# Patient Record
Sex: FEMALE | Race: WHITE
[De-identification: names, ages, dates, MRNs, and addresses within clinical notes are randomized per-mention and may not be internally consistent; named-entity substitution may affect disease eponyms.]

---

## 2017-01-13 ENCOUNTER — HOSPITAL ENCOUNTER (EMERGENCY)
Dept: HOSPITAL 10 - E/R | Age: 67
Discharge: HOME | End: 2017-01-13
Payer: MEDICARE

## 2017-01-13 VITALS
HEIGHT: 62 IN | WEIGHT: 196.21 LBS | HEIGHT: 62 IN | BODY MASS INDEX: 36.11 KG/M2 | WEIGHT: 196.21 LBS | BODY MASS INDEX: 36.11 KG/M2

## 2017-01-13 VITALS — RESPIRATION RATE: 18 BRPM | DIASTOLIC BLOOD PRESSURE: 77 MMHG | SYSTOLIC BLOOD PRESSURE: 137 MMHG | HEART RATE: 88 BPM

## 2017-01-13 DIAGNOSIS — Z79.01: ICD-10-CM

## 2017-01-13 DIAGNOSIS — R42: Primary | ICD-10-CM

## 2017-01-13 DIAGNOSIS — R11.0: ICD-10-CM

## 2017-01-13 DIAGNOSIS — D64.9: ICD-10-CM

## 2017-01-13 DIAGNOSIS — R06.02: ICD-10-CM

## 2017-01-13 LAB
ALBUMIN SERPL-MCNC: 3.9 G/DL (ref 3.3–4.9)
ALBUMIN/GLOB SERPL: 0.95 {RATIO}
ALP SERPL-CCNC: 94 IU/L (ref 42–121)
ALT SERPL-CCNC: 32 IU/L (ref 13–69)
ANION GAP SERPL CALC-SCNC: 16 MMOL/L (ref 8–16)
APTT BLD: 54.7 SEC (ref 25–35)
AST SERPL-CCNC: 29 IU/L (ref 15–46)
BASOPHILS # BLD AUTO: 0 10^3/UL (ref 0–0.1)
BASOPHILS NFR BLD: 0.7 % (ref 0–2)
BILIRUB DIRECT SERPL-MCNC: 0 MG/DL (ref 0–0.2)
BILIRUB SERPL-MCNC: 0.6 MG/DL (ref 0.2–1.3)
BUN SERPL-MCNC: 13 MG/DL (ref 7–20)
CALCIUM SERPL-MCNC: 9.5 MG/DL (ref 8.4–10.2)
CHLORIDE SERPL-SCNC: 102 MMOL/L (ref 97–110)
CO2 SERPL-SCNC: 26 MMOL/L (ref 21–31)
CONDITION: 1
CREAT SERPL-MCNC: 0.77 MG/DL (ref 0.44–1)
EOSINOPHIL # BLD: 0 10^3/UL (ref 0–0.5)
EOSINOPHIL NFR BLD: 0.4 % (ref 0–7)
ERYTHROCYTE [DISTWIDTH] IN BLOOD BY AUTOMATED COUNT: 15 % (ref 11.5–14.5)
GLOBULIN SER-MCNC: 4.1 G/DL (ref 1.3–3.2)
GLUCOSE SERPL-MCNC: 105 MG/DL (ref 70–220)
HCT VFR BLD CALC: 33.3 % (ref 37–47)
HGB BLD-MCNC: 11.5 G/DL (ref 12–16)
INR PPP: 2.14
LH ANALYZER COMMENTS: 1
LYMPHOCYTES # BLD AUTO: 1.7 10^3/UL (ref 0.8–2.9)
LYMPHOCYTES NFR BLD AUTO: 30.8 % (ref 15–51)
MCH RBC QN AUTO: 29.7 PG (ref 29–33)
MCHC RBC AUTO-ENTMCNC: 34.5 G/DL (ref 32–37)
MCV RBC AUTO: 86.1 FL (ref 82–101)
MONOCYTES # BLD: 0.7 10^3/UL (ref 0.3–0.9)
MONOCYTES NFR BLD: 12.1 % (ref 0–11)
NEUTROPHILS # BLD: 3.2 10^3/UL (ref 1.6–7.5)
NEUTROPHILS NFR BLD AUTO: 56 % (ref 39–77)
NRBC # BLD MANUAL: 0 10^3/UL (ref 0–0)
NRBC BLD QL: 0 /100WBC (ref 0–0)
PLATELET # BLD: 186 10^3/UL (ref 140–440)
PMV BLD AUTO: 7.1 FL (ref 7.4–10.4)
POTASSIUM SERPL-SCNC: 3.5 MMOL/L (ref 3.5–5.1)
PROT SERPL-MCNC: 8 G/DL (ref 6.1–8.1)
PROTHROMBIN TIME: 24.1 SEC (ref 12.2–14.2)
PT RATIO: 1.9
RBC # BLD AUTO: 3.87 10^6/UL (ref 4.2–5.4)
SODIUM SERPL-SCNC: 140 MMOL/L (ref 135–144)
TROPONIN I SERPL-MCNC: < 0.012 NG/ML (ref 0–0.12)
WBC # BLD AUTO: 5.7 10^3/UL (ref 4.8–10.8)
WBC # BLD: 5.7 10^3/UL (ref 4.8–10.8)

## 2017-01-13 PROCEDURE — 83605 ASSAY OF LACTIC ACID: CPT

## 2017-01-13 PROCEDURE — 85610 PROTHROMBIN TIME: CPT

## 2017-01-13 PROCEDURE — 96374 THER/PROPH/DIAG INJ IV PUSH: CPT

## 2017-01-13 PROCEDURE — 36415 COLL VENOUS BLD VENIPUNCTURE: CPT

## 2017-01-13 PROCEDURE — 70450 CT HEAD/BRAIN W/O DYE: CPT

## 2017-01-13 PROCEDURE — 96376 TX/PRO/DX INJ SAME DRUG ADON: CPT

## 2017-01-13 PROCEDURE — 85730 THROMBOPLASTIN TIME PARTIAL: CPT

## 2017-01-13 PROCEDURE — 99285 EMERGENCY DEPT VISIT HI MDM: CPT

## 2017-01-13 PROCEDURE — 93005 ELECTROCARDIOGRAM TRACING: CPT

## 2017-01-13 PROCEDURE — 84484 ASSAY OF TROPONIN QUANT: CPT

## 2017-01-13 PROCEDURE — 83690 ASSAY OF LIPASE: CPT

## 2017-01-13 PROCEDURE — 85025 COMPLETE CBC W/AUTO DIFF WBC: CPT

## 2017-01-13 PROCEDURE — 80053 COMPREHEN METABOLIC PANEL: CPT

## 2017-01-13 NOTE — RADRPT
PROCEDURE:   CT Brain without. 

 

CLINICAL INDICATION:  Severe vertigo.

 

TECHNIQUE:   A CT of the brain was performed on multidetector high-resolution CT scanner utilizing a
xial sections from the skull base through the vertex without contrast.  The scan was reviewed in sof
t tissue brain and high frequency resolution bone algorithm windows.  Images were reviewed on a high
-resolution PACS workstation. One or more the following does reduction techniques were utilized: Aut
omated exposure control, adjustment of the mA/ or kV according to patient's size, or use of iterativ
e reconstruction technique. The exam CTDI = 41.12 mGy and the DLP = 720.23 mGy-cm. 

 

COMPARISON:  Brain MRI 02/03/2014.

 

FINDINGS:

 

The ventricles and sulci are mildly prominent indicative of volume loss. There is no intracranial he
morrhage, mass effect or midline shift.  No abnormal intra-axial or extra-axial fluid collections ar
e seen. The gray/white matter differentiation is preserved. 

 

There are mild scattered foci of hypoattenuation in the white matter, which are nonspecific in etiol
ogy but likely reflect chronic small vessel ischemic changes. Small old lacunar infarct is noted in 
the left centrum semiovale. There are trace intracranial vascular calcifications consistent with ath
erosclerosis. The visualized paranasal sinuses demonstrate mild scattered mucosal thickening mainly 
in ethmoid air cells and maxillary sinuses.  The mastoid air cells are essentially clear. 

 

IMPRESSION:

 

1.  No acute intracranial hemorrhage, transcortical infarction or mass effect.

 

2.  Trace intracranial atherosclerosis and mild chronic small vessel ischemic changes. 

 

3.  Small old lacunar infarct is noted in the left centrum semiovale. 

 

4.  Mild generalized cerebral volume loss.

 

 

RPTAT: HH

_____________________________________________ 

.Drew Rios MD, MD           Date    Time 

Electronically viewed and signed by .Drew Rios MD, MD on 01/13/2017 11:44 

 

D:  01/13/2017 11:44  T:  01/13/2017 11:44

.N/

## 2017-01-13 NOTE — ERD
ER Documentation


Chief Complaint


Date/Time


DATE: 1/13/17 


TIME: 14:30


Chief Complaint


dizziness x 2 weeks, chemo 8 days ago worsen since then , nauseated





HPI


This 66-year-old female presents for 2 weeks of vertigo. She has chronic 

vertigo but has been worse procedure chemotherapy 8 days ago.  She feels a 

pressure-like sensation on the right side of her head but denies ear pain. She 

was to clarify that she does not have pain of her head that is just a pressure.

  She is not moving she has very little vertigo but any movement causes 

increased. Said this for years and knows that years ago she had a bad reaction 

and ever which made her vertigo worse. She has not checked it since then. She 

also uses scopolamine patches at home but does not currently have one on.  

Denies any fever and chills. Denies chest pain shortness of breath.





ROS


All systems reviewed and are negative except as per history of present illness.





Medications


Home Meds


Active Scripts


Hydroxyzine Hcl* (Hydroxyzine Hcl*) 25 Mg Tablet, 25 MG PO TID, #30 TAB


   Prov:YANG MCCOY DO         1/13/17


Reported Medications


Scopolamine (Transderm-Scop) 1 Each Patch.td.3, 1 EACH TD EVERY 3 DAYS


   AS DIRECTED


   1/13/17


Ergocalciferol* (Drisdol* (Vitamin D2)) 50,000 Unit Capsule, 45406 UNIT PO Q7D, 

CAP


   1/13/17


Quetiapine Fumarate* (Quetiapine Fumarate*) 100 Mg Tablet, 100 MG PO HS, TAB


   1/13/17


Lorazepam* (Lorazepam*) 1 Mg Tablet, 1 MG PO DAILY Y for ANXIETY, #30 TAB


   1/13/17


Clonidine Hcl* (Clonidine Hcl*) 0.1 Mg Tab, 0.1 MG PO DAILY Y for ELEVATED 

BLOOD PRESSURE, TAB


   1/13/17


Folic Acid* (Folic Acid*) 1 Mg Tablet, 1 MG PO DAILY, TAB


   1/13/17


Vilazodone Hcl (Viibryd) 20 Mg Tablet, 20 MG PO DAILY, TAB


   1/13/17


Warfarin Sodium* (Warfarin Sodium*) 6 Mg Tablet, 6 MG PO DAILY, TAB


   1/13/17


Amlodipine Besylate* (Amlodipine Besylate*) 5 Mg Tablet, 5 MG PO DAILY, #2


   1/26/14


Discontinued Reported Medications


Paroxetine Hcl* (Paxil*) 40 Mg Tablet, 40 MG PO DAILY, #1


   1/26/14


Warfarin Sodium (Coumadin) 5 Mg/Vial Vial, 5 MG IV DAILY, #0.5


   1/26/14


Warfarin Sodium* (Coumadin*) 4 Mg Tablet, 4 MG PO DAILY, #1


   1/26/14


Acetaminophen (Tylenol) 500 Mg Tab


   1/23/11





Allergies


Allergies:  


Coded Allergies:  


     No Known Drug Allergies (Verified  Allergy, Mild, 1/13/17)





PMhx/Soc


History of Surgery:  Yes (L axilla lumpectomy)


Anesthesia Reaction:  No


Hx Neurological Disorder:  No


Hx Respiratory Disorders:  Yes (c/o SOB )


Hx Cardiac Disorders:  No


Hx Psychiatric Problems:  Yes (Anxiety, Depression)


Hx Miscellaneous Medical Probl:  No (hepatic cysts,anxiety,depression,kidney 

stones,LBP, R eye clots,vertigo)


Hx Alcohol Use:  No


Hx Substance Use:  No


Hx Tobacco Use:  No


Smoking Status:  Never smoker





Physical Exam


Vitals





Vital Signs








  Date Time  Temp Pulse Resp B/P Pulse Ox O2 Delivery O2 Flow Rate FiO2


 


1/13/17 13:02  88 18 137/77 100 Room Air  


 


1/13/17 10:44 98.1 105 20 188/101 99   








Physical Exam


Const:  []             No acute distress, pleasant


Head:   Atraumatic 


Eyes:    Normal Conjunctiva on the EOMI, PERRLA


ENT:    Normal External Ears, Nose and Mouth.


Neck:               Full range of motion..~ No meningismus.


Resp:    Clear to auscultation bilaterally


Cardio:    Regular rate and rhythm, no murmurs


Abd:    Soft, non tender, non distended. Normal bowel sounds


Skin:    No petechiae or rashes


Back:    No midline or flank tenderness


Ext:    No cyanosis, or edema


Neur:    Awake and alert and oriented 3, cranial nerves II through XII intact, 

certain on finger-nose intact, normal gait


Psych:    Normal Mood and Affect


Result Diagram:  


1/13/17 1046                                                                   

             1/13/17 1120





Results 24 hrs





 Laboratory Tests








Test


  1/13/17


10:46 1/13/17


11:20


 


Basophils # 0.010^3/ul  


 


Basophils % 0.7%  


 


Blood Morphology Comment   


 


Eosinophils # 0.010^3/ul  


 


Eosinophils % 0.4%  


 


Hematocrit 33.3%  


 


Hemoglobin 11.5g/dl  


 


Lactic Acid Level 1.4mmol/L  


 


Lymphocytes # 1.710^3/ul  


 


Lymphocytes % 30.8%  


 


Mean Corpuscular Hemoglobin 29.7pg  


 


Mean Corpuscular Hemoglobin


Concent 34.5g/dl 


  


 


 


Mean Corpuscular Volume 86.1fl  


 


Mean Platelet Volume 7.1fl  


 


Monocytes # 0.710^3/ul  


 


Monocytes % 12.1%  


 


Neutrophils # 3.210^3/ul  


 


Neutrophils % 56.0%  


 


Nucleated Red Blood Cells # 0.010^3/ul  


 


Nucleated Red Blood Cells % 0.0/100WBC  


 


Platelet Count 27320^3/UL  


 


Red Blood Count 3.8710^6/ul  


 


Red Cell Distribution Width 15.0%  


 


White Blood Count 5.710^3/ul  


 


Activated Partial


Thromboplast Time 


  54.7Sec 


 


 


Alanine Aminotransferase


(ALT/SGPT) 


  32IU/L 


 


 


Albumin  3.9g/dl 


 


Albumin/Globulin Ratio  0.95 


 


Alkaline Phosphatase  94IU/L 


 


Anion Gap  16 


 


Aspartate Amino Transf


(AST/SGOT) 


  29IU/L 


 


 


Blood Urea Nitrogen  13mg/dl 


 


Calcium Level  9.5mg/dl 


 


Carbon Dioxide Level  26mmol/L 


 


Chloride Level  102mmol/L 


 


Creatinine  0.77mg/dl 


 


Direct Bilirubin  0.00mg/dl 


 


Globulin  4.10g/dl 


 


Glucose Level  105mg/dl 


 


INR International Normalized


Ratio 


  2.14 


 


 


Indirect Bilirubin  0.6mg/dl 


 


Lipase  63U/L 


 


Potassium Level  3.5mmol/L 


 


Prothrombin Time  24.1Sec 


 


Prothrombin Time Ratio  1.9 


 


Sodium Level  140mmol/L 


 


Total Bilirubin  0.6mg/dl 


 


Total Protein  8.0g/dl 


 


Troponin I  < 0.012ng/ml 








 Current Medications








 Medications


  (Trade)  Dose


 Ordered  Sig/Kalie


 Route


 PRN Reason  Start Time


 Stop Time Status Last Admin


Dose Admin


 


 Sodium Chloride


  (NS)  1,000 ml @ 


 1,000 mls/hr  Q1H STAT


 IV


   1/13/17 10:46


 1/13/17 11:45 DC 1/13/17 11:08


 


 


 Ondansetron HCl


  (Zofran Inj)  4 mg  ONCE  STAT


 IV


   1/13/17 10:46


 1/13/17 10:48 DC 1/13/17 11:08


 


 


 Meclizine HCl


  (Antivert)  25 mg  ONCE  ONCE


 PO


   1/13/17 11:00


 1/13/17 11:01 DC 1/13/17 11:08


 


 


 Ondansetron HCl


  (Zofran Inj)  4 mg  ONCE  STAT


 IV


   1/13/17 10:48


 1/13/17 10:49 DC 1/13/17 11:41


 


 


 Scopolamine


  (Transderm-Scop)  1 patch  ONCE  ONCE


 TRANSDERM


   1/13/17 14:00


 1/13/17 14:01 DC 1/13/17 13:59


 











Procedures/MDM


66-year-old female chronic vertigo that she says been worse since her last 

chemotherapy appointment. She is recently seen an ENT in testing and did not 

find any reasons. She is given a trial of Antivert which she said had a similar 

reaction the last time which quickly resolved. She is also given a scopolamine 

patch. She states that her vertigo is much better when she got. She does not 

want to stay in the hospital. I also hydrated her with normal saline gave her 

Zofran which received her nausea.  There are no signs of acute infection. There 

are no signs of gross metastases to the brain.  She is ambulatory and in walk 

to the emergency room on her own power.  States today being discharged and 

having MRI as an outpatient. Family also likes this option.  We'll discharge 

her with prescription for hydroxyzine and instructions not to take too many anti

-cholinergic medications same time. She has Caponi patches at home. Given 

instructions to call her primary care doctor, today if possible, to arrange an 

MRI as an outpatient and possibly a neurological referral.








EKG interpretation: Normal sinus rhythm rate of 91, normal axis, no ST or T-

wave changes concerning for acute ischemia. Normal EKG


Right monitor interpretation: Normal sinus rhythm without arrhythmia


CT head interpretation: I see no acute process no hemorrhage no abnormal mass, 

no midline shift, no skull fractures





Departure


Diagnosis:  


 Primary Impression:  


 Vertigo


 Additional Impression:  


 Normocytic anemia


Condition:  Stable


Patient Instructions:  Vertigo, Unspecified





Additional Instructions:  


Call your primary care doctor TOMORROW for an appointment during the next 2-3 

days.  Request a referral for MRI, and possibly a neurologist.  See the doctor 

sooner or return here if your condition worsens before your appointment time.











YANG MCCOY DO Jan 13, 2017 14:46

## 2018-02-12 ENCOUNTER — HOSPITAL ENCOUNTER (EMERGENCY)
Dept: HOSPITAL 91 - E/R | Age: 68
Discharge: HOME | End: 2018-02-12
Payer: MEDICARE

## 2018-02-12 ENCOUNTER — HOSPITAL ENCOUNTER (EMERGENCY)
Age: 68
Discharge: HOME | End: 2018-02-12

## 2018-02-12 DIAGNOSIS — C34.90: Primary | ICD-10-CM

## 2018-02-12 DIAGNOSIS — R07.9: ICD-10-CM

## 2018-02-12 DIAGNOSIS — Z79.01: ICD-10-CM

## 2018-02-12 LAB
ADD MAN DIFF?: NO
ALANINE AMINOTRANSFERASE: 26 IU/L (ref 13–69)
ALBUMIN/GLOBULIN RATIO: 0.97
ALBUMIN: 4.3 G/DL (ref 3.3–4.9)
ALKALINE PHOSPHATASE: 121 IU/L (ref 42–121)
ANION GAP: 13 (ref 8–16)
ASPARTATE AMINO TRANSFERASE: 40 IU/L (ref 15–46)
BASOPHIL #: 0.1 10^3/UL (ref 0–0.1)
BASOPHILS %: 1 % (ref 0–2)
BILIRUBIN,DIRECT: 0 MG/DL (ref 0–0.2)
BILIRUBIN,TOTAL: 0.3 MG/DL (ref 0.2–1.3)
BLOOD UREA NITROGEN: 12 MG/DL (ref 7–20)
CALCIUM: 10 MG/DL (ref 8.4–10.2)
CARBON DIOXIDE: 30 MMOL/L (ref 21–31)
CHLORIDE: 105 MMOL/L (ref 97–110)
CREATININE: 0.81 MG/DL (ref 0.44–1)
EOSINOPHILS #: 0.1 10^3/UL (ref 0–0.5)
EOSINOPHILS %: 0.8 % (ref 0–7)
GLOBULIN: 4.4 G/DL (ref 1.3–3.2)
GLUCOSE: 106 MG/DL (ref 70–220)
HEMATOCRIT: 40.2 % (ref 37–47)
HEMOGLOBIN: 12.9 G/DL (ref 12–16)
INR: 2.11
LYMPHOCYTES #: 2.1 10^3/UL (ref 0.8–2.9)
LYMPHOCYTES %: 23.4 % (ref 15–51)
MEAN CORPUSCULAR HEMOGLOBIN: 27.9 PG (ref 29–33)
MEAN CORPUSCULAR HGB CONC: 32.1 G/DL (ref 32–37)
MEAN CORPUSCULAR VOLUME: 86.8 FL (ref 82–101)
MEAN PLATELET VOLUME: 9.5 FL (ref 7.4–10.4)
MONOCYTE #: 0.7 10^3/UL (ref 0.3–0.9)
MONOCYTES %: 7.8 % (ref 0–11)
NEUTROPHIL #: 5.9 10^3/UL (ref 1.6–7.5)
NEUTROPHILS %: 66.4 % (ref 39–77)
NUCLEATED RED BLOOD CELLS #: 0 10^3/UL (ref 0–0)
NUCLEATED RED BLOOD CELLS%: 0 /100WBC (ref 0–0)
PARTIAL THROMBOPLASTIN TIME: 63.1 SEC (ref 25–35)
PLATELET COUNT: 354 10^3/UL (ref 140–415)
POTASSIUM: 4 MMOL/L (ref 3.5–5.1)
PROTIME: 24.2 SEC (ref 11.9–14.9)
PT RATIO: 1.9
RED BLOOD COUNT: 4.63 10^6/UL (ref 4.2–5.4)
RED CELL DISTRIBUTION WIDTH: 13.5 % (ref 11.5–14.5)
SODIUM: 144 MMOL/L (ref 135–144)
TOTAL PROTEIN: 8.7 G/DL (ref 6.1–8.1)
TROPONIN-I: < 0.012 NG/ML (ref 0–0.12)
WHITE BLOOD COUNT: 8.9 10^3/UL (ref 4.8–10.8)

## 2018-02-12 PROCEDURE — 85025 COMPLETE CBC W/AUTO DIFF WBC: CPT

## 2018-02-12 PROCEDURE — 96376 TX/PRO/DX INJ SAME DRUG ADON: CPT

## 2018-02-12 PROCEDURE — 71275 CT ANGIOGRAPHY CHEST: CPT

## 2018-02-12 PROCEDURE — 84484 ASSAY OF TROPONIN QUANT: CPT

## 2018-02-12 PROCEDURE — 85610 PROTHROMBIN TIME: CPT

## 2018-02-12 PROCEDURE — 96374 THER/PROPH/DIAG INJ IV PUSH: CPT

## 2018-02-12 PROCEDURE — 93005 ELECTROCARDIOGRAM TRACING: CPT

## 2018-02-12 PROCEDURE — 71045 X-RAY EXAM CHEST 1 VIEW: CPT

## 2018-02-12 PROCEDURE — 80053 COMPREHEN METABOLIC PANEL: CPT

## 2018-02-12 PROCEDURE — 96375 TX/PRO/DX INJ NEW DRUG ADDON: CPT

## 2018-02-12 PROCEDURE — 36415 COLL VENOUS BLD VENIPUNCTURE: CPT

## 2018-02-12 PROCEDURE — 85730 THROMBOPLASTIN TIME PARTIAL: CPT

## 2018-02-12 PROCEDURE — 99285 EMERGENCY DEPT VISIT HI MDM: CPT

## 2018-02-12 PROCEDURE — 93970 EXTREMITY STUDY: CPT

## 2018-02-12 RX ADMIN — VASOPRESSIN 1 ML/SEC: 20 INJECTION, SOLUTION INTRAMUSCULAR; SUBCUTANEOUS at 11:22

## 2018-02-12 RX ADMIN — HYDROMORPHONE HYDROCHLORIDE 1 MG: 1 INJECTION, SOLUTION INTRAMUSCULAR; INTRAVENOUS; SUBCUTANEOUS at 09:06

## 2018-02-12 RX ADMIN — IOHEXOL 1 ML/SEC: 350 INJECTION, SOLUTION INTRAVENOUS at 11:21

## 2018-02-12 RX ADMIN — HYDROMORPHONE HYDROCHLORIDE 1 MG: 1 INJECTION, SOLUTION INTRAMUSCULAR; INTRAVENOUS; SUBCUTANEOUS at 12:50

## 2018-02-12 RX ADMIN — SODIUM CHLORIDE 1 ML: 9 INJECTION, SOLUTION INTRAMUSCULAR; INTRAVENOUS; SUBCUTANEOUS at 11:20

## 2018-02-12 RX ADMIN — ONDANSETRON HYDROCHLORIDE 1 MG: 2 INJECTION, SOLUTION INTRAMUSCULAR; INTRAVENOUS at 09:05

## 2018-02-12 RX ADMIN — SODIUM CHLORIDE 1 ML: 9 INJECTION, SOLUTION INTRAMUSCULAR; INTRAVENOUS; SUBCUTANEOUS at 11:21

## 2018-02-12 RX ADMIN — VASOPRESSIN 1 ML/SEC: 20 INJECTION, SOLUTION INTRAMUSCULAR; SUBCUTANEOUS at 11:20

## 2018-02-12 RX ADMIN — IOHEXOL 1 ML/SEC: 350 INJECTION, SOLUTION INTRAVENOUS at 11:22

## 2018-02-24 ENCOUNTER — HOSPITAL ENCOUNTER (INPATIENT)
Dept: HOSPITAL 91 - E/R | Age: 68
LOS: 10 days | Discharge: HOME | DRG: 180 | End: 2018-03-06
Payer: MEDICARE

## 2018-02-24 ENCOUNTER — HOSPITAL ENCOUNTER (INPATIENT)
Age: 68
LOS: 10 days | Discharge: HOME | DRG: 180 | End: 2018-03-06

## 2018-02-24 DIAGNOSIS — I10: ICD-10-CM

## 2018-02-24 DIAGNOSIS — C34.31: ICD-10-CM

## 2018-02-24 DIAGNOSIS — J96.21: ICD-10-CM

## 2018-02-24 DIAGNOSIS — D63.8: ICD-10-CM

## 2018-02-24 DIAGNOSIS — D68.59: ICD-10-CM

## 2018-02-24 DIAGNOSIS — Z66: ICD-10-CM

## 2018-02-24 DIAGNOSIS — C79.51: ICD-10-CM

## 2018-02-24 DIAGNOSIS — C34.2: ICD-10-CM

## 2018-02-24 DIAGNOSIS — C78.7: ICD-10-CM

## 2018-02-24 DIAGNOSIS — D64.9: ICD-10-CM

## 2018-02-24 DIAGNOSIS — K59.00: ICD-10-CM

## 2018-02-24 DIAGNOSIS — M48.54XA: ICD-10-CM

## 2018-02-24 DIAGNOSIS — J90: ICD-10-CM

## 2018-02-24 DIAGNOSIS — C34.01: Primary | ICD-10-CM

## 2018-02-24 DIAGNOSIS — K80.20: ICD-10-CM

## 2018-02-24 LAB
% IRON SATURATION: 6 % SAT (ref 22–52)
ADD MAN DIFF?: NO
ADD UMIC: YES
ALANINE AMINOTRANSFERASE: 27 IU/L (ref 13–69)
ALBUMIN/GLOBULIN RATIO: 1
ALBUMIN: 3.6 G/DL (ref 3.3–4.9)
ALKALINE PHOSPHATASE: 120 IU/L (ref 42–121)
ANION GAP: 17 (ref 8–16)
ASPARTATE AMINO TRANSFERASE: 24 IU/L (ref 15–46)
BASOPHIL #: 0.1 10^3/UL (ref 0–0.1)
BASOPHILS %: 0.5 % (ref 0–2)
BILIRUBIN,DIRECT: 0 MG/DL (ref 0–0.2)
BILIRUBIN,TOTAL: 0 MG/DL (ref 0.2–1.3)
BLOOD UREA NITROGEN: 14 MG/DL (ref 7–20)
CALCIUM: 9.8 MG/DL (ref 8.4–10.2)
CARBON DIOXIDE: 30 MMOL/L (ref 21–31)
CHLORIDE: 100 MMOL/L (ref 97–110)
CREATININE: 0.89 MG/DL (ref 0.44–1)
EOSINOPHILS #: 0 10^3/UL (ref 0–0.5)
EOSINOPHILS %: 0.3 % (ref 0–7)
GLOBULIN: 3.6 G/DL (ref 1.3–3.2)
GLUCOSE: 135 MG/DL (ref 70–220)
HEMATOCRIT: 34.5 % (ref 37–47)
HEMOGLOBIN: 10.8 G/DL (ref 12–16)
INR: 2.18
IRON: 13 UG/DL (ref 35–150)
LACTIC ACID: 0.9 MMOL/L (ref 0.5–2)
LACTIC ACID: 1.1 MMOL/L (ref 0.5–2)
LACTIC ACID: 1.6 MMOL/L (ref 0.5–2)
LIPASE: 65 U/L (ref 23–300)
LYMPHOCYTES #: 0.7 10^3/UL (ref 0.8–2.9)
LYMPHOCYTES %: 7.6 % (ref 15–51)
MEAN CORPUSCULAR HEMOGLOBIN: 26.7 PG (ref 29–33)
MEAN CORPUSCULAR HGB CONC: 31.3 G/DL (ref 32–37)
MEAN CORPUSCULAR VOLUME: 85.2 FL (ref 82–101)
MEAN PLATELET VOLUME: 9.4 FL (ref 7.4–10.4)
MONOCYTE #: 0.7 10^3/UL (ref 0.3–0.9)
MONOCYTES %: 7.7 % (ref 0–11)
NEUTROPHIL #: 7.8 10^3/UL (ref 1.6–7.5)
NEUTROPHILS %: 83.6 % (ref 39–77)
NUCLEATED RED BLOOD CELLS #: 0 10^3/UL (ref 0–0)
NUCLEATED RED BLOOD CELLS%: 0 /100WBC (ref 0–0)
PARTIAL THROMBOPLASTIN TIME: 59.1 SEC (ref 25–35)
PLATELET COUNT: 329 10^3/UL (ref 140–415)
POTASSIUM: 4.2 MMOL/L (ref 3.5–5.1)
PROTIME: 24.8 SEC (ref 11.9–14.9)
PT RATIO: 1.9
RED BLOOD COUNT: 4.05 10^6/UL (ref 4.2–5.4)
RED CELL DISTRIBUTION WIDTH: 13.3 % (ref 11.5–14.5)
SODIUM: 143 MMOL/L (ref 135–144)
TOTAL IRON BINDING CAPACITY: 201 UG/DL (ref 241–421)
TOTAL PROTEIN: 7.2 G/DL (ref 6.1–8.1)
TROPONIN-I: < 0.012 NG/ML (ref 0–0.12)
UR ASCORBIC ACID: NEGATIVE MG/DL
UR BACTERIA: (no result) /HPF
UR BILIRUBIN (DIP): NEGATIVE MG/DL
UR BLOOD (DIP): (no result) MG/DL
UR CLARITY: CLEAR
UR COLOR: (no result)
UR GLUCOSE (DIP): NEGATIVE MG/DL
UR KETONES (DIP): NEGATIVE MG/DL
UR LEUKOCYTE ESTERASE (DIP): NEGATIVE LEU/UL
UR MUCUS: (no result) /HPF
UR NITRITE (DIP): NEGATIVE MG/DL
UR PH (DIP): 5 (ref 5–9)
UR RBC: 9 /HPF (ref 0–5)
UR SPECIFIC GRAVITY (DIP): 1.02 (ref 1–1.03)
UR TOTAL PROTEIN (DIP): (no result) MG/DL
UR UROBILINOGEN (DIP): NEGATIVE MG/DL
UR WBC: 1 /HPF (ref 0–5)
WHITE BLOOD COUNT: 9.4 10^3/UL (ref 4.8–10.8)

## 2018-02-24 PROCEDURE — 96368 THER/DIAG CONCURRENT INF: CPT

## 2018-02-24 PROCEDURE — 94660 CPAP INITIATION&MGMT: CPT

## 2018-02-24 PROCEDURE — 94640 AIRWAY INHALATION TREATMENT: CPT

## 2018-02-24 PROCEDURE — 94664 DEMO&/EVAL PT USE INHALER: CPT

## 2018-02-24 PROCEDURE — 97116 GAIT TRAINING THERAPY: CPT

## 2018-02-24 PROCEDURE — 5A09357 ASSISTANCE WITH RESPIRATORY VENTILATION, LESS THAN 24 CONSECUTIVE HOURS, CONTINUOUS POSITIVE AIRWAY PRESSURE: ICD-10-PCS

## 2018-02-24 PROCEDURE — 97535 SELF CARE MNGMENT TRAINING: CPT

## 2018-02-24 PROCEDURE — 84100 ASSAY OF PHOSPHORUS: CPT

## 2018-02-24 PROCEDURE — 84484 ASSAY OF TROPONIN QUANT: CPT

## 2018-02-24 PROCEDURE — 81001 URINALYSIS AUTO W/SCOPE: CPT

## 2018-02-24 PROCEDURE — 84155 ASSAY OF PROTEIN SERUM: CPT

## 2018-02-24 PROCEDURE — 87102 FUNGUS ISOLATION CULTURE: CPT

## 2018-02-24 PROCEDURE — 83540 ASSAY OF IRON: CPT

## 2018-02-24 PROCEDURE — 82040 ASSAY OF SERUM ALBUMIN: CPT

## 2018-02-24 PROCEDURE — 83735 ASSAY OF MAGNESIUM: CPT

## 2018-02-24 PROCEDURE — 85025 COMPLETE CBC W/AUTO DIFF WBC: CPT

## 2018-02-24 PROCEDURE — 71045 X-RAY EXAM CHEST 1 VIEW: CPT

## 2018-02-24 PROCEDURE — 83605 ASSAY OF LACTIC ACID: CPT

## 2018-02-24 PROCEDURE — 97165 OT EVAL LOW COMPLEX 30 MIN: CPT

## 2018-02-24 PROCEDURE — 99285 EMERGENCY DEPT VISIT HI MDM: CPT

## 2018-02-24 PROCEDURE — 36415 COLL VENOUS BLD VENIPUNCTURE: CPT

## 2018-02-24 PROCEDURE — 85730 THROMBOPLASTIN TIME PARTIAL: CPT

## 2018-02-24 PROCEDURE — 83615 LACTATE (LD) (LDH) ENZYME: CPT

## 2018-02-24 PROCEDURE — 97161 PT EVAL LOW COMPLEX 20 MIN: CPT

## 2018-02-24 PROCEDURE — 76942 ECHO GUIDE FOR BIOPSY: CPT

## 2018-02-24 PROCEDURE — 89051 BODY FLUID CELL COUNT: CPT

## 2018-02-24 PROCEDURE — 82945 GLUCOSE OTHER FLUID: CPT

## 2018-02-24 PROCEDURE — 76705 ECHO EXAM OF ABDOMEN: CPT

## 2018-02-24 PROCEDURE — 80048 BASIC METABOLIC PNL TOTAL CA: CPT

## 2018-02-24 PROCEDURE — 71275 CT ANGIOGRAPHY CHEST: CPT

## 2018-02-24 PROCEDURE — 83690 ASSAY OF LIPASE: CPT

## 2018-02-24 PROCEDURE — 96365 THER/PROPH/DIAG IV INF INIT: CPT

## 2018-02-24 PROCEDURE — 96366 THER/PROPH/DIAG IV INF ADDON: CPT

## 2018-02-24 PROCEDURE — 84157 ASSAY OF PROTEIN OTHER: CPT

## 2018-02-24 PROCEDURE — 74177 CT ABD & PELVIS W/CONTRAST: CPT

## 2018-02-24 PROCEDURE — 87070 CULTURE OTHR SPECIMN AEROBIC: CPT

## 2018-02-24 PROCEDURE — 94644 CONT INHLJ TX 1ST HOUR: CPT

## 2018-02-24 PROCEDURE — 96375 TX/PRO/DX INJ NEW DRUG ADDON: CPT

## 2018-02-24 PROCEDURE — 80069 RENAL FUNCTION PANEL: CPT

## 2018-02-24 PROCEDURE — 80053 COMPREHEN METABOLIC PANEL: CPT

## 2018-02-24 PROCEDURE — 97530 THERAPEUTIC ACTIVITIES: CPT

## 2018-02-24 PROCEDURE — 87040 BLOOD CULTURE FOR BACTERIA: CPT

## 2018-02-24 PROCEDURE — 87116 MYCOBACTERIA CULTURE: CPT

## 2018-02-24 PROCEDURE — 85610 PROTHROMBIN TIME: CPT

## 2018-02-24 RX ADMIN — PIPERACILLIN SODIUM AND TAZOBACTAM SODIUM 1 MLS/HR: 3; .375 INJECTION, POWDER, LYOPHILIZED, FOR SOLUTION INTRAVENOUS at 10:30

## 2018-02-24 RX ADMIN — PIPERACILLIN SODIUM AND TAZOBACTAM SODIUM 1 MLS/HR: 3; .375 INJECTION, POWDER, LYOPHILIZED, FOR SOLUTION INTRAVENOUS at 22:46

## 2018-02-24 RX ADMIN — AMLODIPINE BESYLATE 1 MG: 10 TABLET ORAL at 14:00

## 2018-02-24 RX ADMIN — HYDROMORPHONE HYDROCHLORIDE 1 MG: 1 INJECTION, SOLUTION INTRAMUSCULAR; INTRAVENOUS; SUBCUTANEOUS at 06:11

## 2018-02-24 RX ADMIN — MAGNESIUM HYDROXIDE 1 ML: 400 SUSPENSION ORAL at 20:10

## 2018-02-24 RX ADMIN — NITROGLYCERIN 1 TAB: 0.4 TABLET, ORALLY DISINTEGRATING SUBLINGUAL at 10:09

## 2018-02-24 RX ADMIN — SODIUM PHOSPHATE, DIBASIC AND SODIUM PHOSPHATE, MONOBASIC 1 ML: 7; 19 ENEMA RECTAL at 20:15

## 2018-02-24 RX ADMIN — DOCUSATE SODIUM AND SENNOSIDES 1 TAB: 8.6; 5 TABLET, FILM COATED ORAL at 20:10

## 2018-02-24 RX ADMIN — ONDANSETRON HYDROCHLORIDE 1 MG: 2 INJECTION, SOLUTION INTRAMUSCULAR; INTRAVENOUS at 06:11

## 2018-02-24 RX ADMIN — Medication 1 MLS/HR: at 20:00

## 2018-02-24 RX ADMIN — PHYTONADIONE 1 MG: 10 INJECTION, EMULSION INTRAMUSCULAR; INTRAVENOUS; SUBCUTANEOUS at 18:00

## 2018-02-24 RX ADMIN — Medication 1 MLS/HR: at 16:22

## 2018-02-24 RX ADMIN — ALBUTEROL SULFATE 1 MG: 2.5 SOLUTION RESPIRATORY (INHALATION) at 09:03

## 2018-02-24 RX ADMIN — METHYLPREDNISOLONE SODIUM SUCCINATE 1 MG: 40 INJECTION, POWDER, FOR SOLUTION INTRAMUSCULAR; INTRAVENOUS at 15:55

## 2018-02-24 RX ADMIN — PIPERACILLIN SODIUM AND TAZOBACTAM SODIUM 1 MLS/HR: 3; .375 INJECTION, POWDER, LYOPHILIZED, FOR SOLUTION INTRAVENOUS at 15:55

## 2018-02-24 RX ADMIN — MORPHINE SULFATE 1 MG: 2 INJECTION, SOLUTION INTRAMUSCULAR; INTRAVENOUS at 10:09

## 2018-02-24 RX ADMIN — THIAMINE HYDROCHLORIDE 1 MLS/HR: 100 INJECTION, SOLUTION INTRAMUSCULAR; INTRAVENOUS at 06:11

## 2018-02-24 RX ADMIN — DOCUSATE SODIUM AND SENNOSIDES 1 TAB: 8.6; 5 TABLET, FILM COATED ORAL at 16:22

## 2018-02-24 RX ADMIN — FUROSEMIDE 1 MG: 10 INJECTION, SOLUTION INTRAVENOUS at 10:09

## 2018-02-24 RX ADMIN — NITROGLYCERIN 1 INCH: 20 OINTMENT TOPICAL at 10:10

## 2018-02-24 RX ADMIN — ONDANSETRON HYDROCHLORIDE 1 MG: 2 INJECTION, SOLUTION INTRAMUSCULAR; INTRAVENOUS at 19:56

## 2018-02-24 RX ADMIN — HYDROMORPHONE HYDROCHLORIDE 1 MG: 1 INJECTION, SOLUTION INTRAMUSCULAR; INTRAVENOUS; SUBCUTANEOUS at 19:56

## 2018-02-25 LAB
ADD MAN DIFF?: NO
ALBUMIN: 3.5 G/DL (ref 3.3–4.9)
ANION GAP: 14 (ref 8–16)
BASOPHIL #: 0 10^3/UL (ref 0–0.1)
BASOPHILS %: 0.3 % (ref 0–2)
BLOOD UREA NITROGEN: 13 MG/DL (ref 7–20)
CALCIUM: 9.4 MG/DL (ref 8.4–10.2)
CARBON DIOXIDE: 33 MMOL/L (ref 21–31)
CHLORIDE: 106 MMOL/L (ref 97–110)
CREATININE: 0.9 MG/DL (ref 0.44–1)
EOSINOPHILS #: 0 10^3/UL (ref 0–0.5)
EOSINOPHILS %: 0 % (ref 0–7)
GLUCOSE: 128 MG/DL (ref 70–220)
HEMATOCRIT: 31.5 % (ref 37–47)
HEMOGLOBIN: 9.9 G/DL (ref 12–16)
INR: 2.43
LYMPHOCYTES #: 0.7 10^3/UL (ref 0.8–2.9)
LYMPHOCYTES %: 11.9 % (ref 15–51)
MAGNESIUM: 2.2 MG/DL (ref 1.7–2.5)
MEAN CORPUSCULAR HEMOGLOBIN: 27 PG (ref 29–33)
MEAN CORPUSCULAR HGB CONC: 31.4 G/DL (ref 32–37)
MEAN CORPUSCULAR VOLUME: 86.1 FL (ref 82–101)
MEAN PLATELET VOLUME: 9.3 FL (ref 7.4–10.4)
MONOCYTE #: 0.4 10^3/UL (ref 0.3–0.9)
MONOCYTES %: 6.2 % (ref 0–11)
NEUTROPHIL #: 4.9 10^3/UL (ref 1.6–7.5)
NEUTROPHILS %: 81.3 % (ref 39–77)
NUCLEATED RED BLOOD CELLS #: 0 10^3/UL (ref 0–0)
NUCLEATED RED BLOOD CELLS%: 0 /100WBC (ref 0–0)
PHOSPHORUS: 4.7 MG/DL (ref 2.5–4.9)
PLATELET COUNT: 303 10^3/UL (ref 140–415)
POTASSIUM: 4.1 MMOL/L (ref 3.5–5.1)
PROTIME: 27.1 SEC (ref 11.9–14.9)
PT RATIO: 2.1
RED BLOOD COUNT: 3.66 10^6/UL (ref 4.2–5.4)
RED CELL DISTRIBUTION WIDTH: 13.6 % (ref 11.5–14.5)
SODIUM: 149 MMOL/L (ref 135–144)
WHITE BLOOD COUNT: 6 10^3/UL (ref 4.8–10.8)

## 2018-02-25 RX ADMIN — PHYTONADIONE 1 MG: 10 INJECTION, EMULSION INTRAMUSCULAR; INTRAVENOUS; SUBCUTANEOUS at 11:40

## 2018-02-25 RX ADMIN — ONDANSETRON HYDROCHLORIDE 1 MG: 2 INJECTION, SOLUTION INTRAMUSCULAR; INTRAVENOUS at 10:07

## 2018-02-25 RX ADMIN — PIPERACILLIN SODIUM AND TAZOBACTAM SODIUM 1 MLS/HR: 3; .375 INJECTION, POWDER, LYOPHILIZED, FOR SOLUTION INTRAVENOUS at 06:26

## 2018-02-25 RX ADMIN — METOPROLOL SUCCINATE 1 MG: 25 TABLET, EXTENDED RELEASE ORAL at 13:14

## 2018-02-25 RX ADMIN — PIPERACILLIN SODIUM AND TAZOBACTAM SODIUM 1 MLS/HR: 3; .375 INJECTION, POWDER, LYOPHILIZED, FOR SOLUTION INTRAVENOUS at 21:49

## 2018-02-25 RX ADMIN — AMLODIPINE BESYLATE 1 MG: 10 TABLET ORAL at 13:18

## 2018-02-25 RX ADMIN — DOCUSATE SODIUM AND SENNOSIDES 1 TAB: 8.6; 5 TABLET, FILM COATED ORAL at 20:52

## 2018-02-25 RX ADMIN — Medication 1 MLS/HR: at 20:55

## 2018-02-25 RX ADMIN — METHYLPREDNISOLONE SODIUM SUCCINATE 1 MG: 40 INJECTION, POWDER, FOR SOLUTION INTRAMUSCULAR; INTRAVENOUS at 08:51

## 2018-02-25 RX ADMIN — LIDOCAINE 1 PATCH: 50 PATCH TOPICAL at 11:46

## 2018-02-25 RX ADMIN — PAROXETINE HYDROCHLORIDE 1 MG: 20 TABLET, FILM COATED ORAL at 08:57

## 2018-02-25 RX ADMIN — Medication 1 MLS/HR: at 15:14

## 2018-02-25 RX ADMIN — DOCUSATE SODIUM AND SENNOSIDES 1 TAB: 8.6; 5 TABLET, FILM COATED ORAL at 08:53

## 2018-02-25 RX ADMIN — FUROSEMIDE 1 MG: 10 INJECTION, SOLUTION INTRAVENOUS at 11:39

## 2018-02-25 RX ADMIN — Medication 1 MLS/HR: at 08:55

## 2018-02-25 RX ADMIN — Medication 1 MLS/HR: at 02:00

## 2018-02-25 RX ADMIN — PANTOPRAZOLE SODIUM 1 MG: 40 TABLET, DELAYED RELEASE ORAL at 06:26

## 2018-02-25 RX ADMIN — PIPERACILLIN SODIUM AND TAZOBACTAM SODIUM 1 MLS/HR: 3; .375 INJECTION, POWDER, LYOPHILIZED, FOR SOLUTION INTRAVENOUS at 13:18

## 2018-02-25 RX ADMIN — ONDANSETRON HYDROCHLORIDE 1 MG: 2 INJECTION, SOLUTION INTRAMUSCULAR; INTRAVENOUS at 20:52

## 2018-02-26 LAB
ADD MAN DIFF?: NO
ALBUMIN: 3.3 G/DL (ref 3.3–4.9)
ANION GAP: 12 (ref 8–16)
BASOPHIL #: 0 10^3/UL (ref 0–0.1)
BASOPHILS %: 0.5 % (ref 0–2)
BLOOD UREA NITROGEN: 13 MG/DL (ref 7–20)
CALCIUM: 9.4 MG/DL (ref 8.4–10.2)
CARBON DIOXIDE: 35 MMOL/L (ref 21–31)
CHLORIDE: 104 MMOL/L (ref 97–110)
CREATININE: 0.89 MG/DL (ref 0.44–1)
EOSINOPHILS #: 0 10^3/UL (ref 0–0.5)
EOSINOPHILS %: 0.3 % (ref 0–7)
GLUCOSE: 104 MG/DL (ref 70–220)
HEMATOCRIT: 31.4 % (ref 37–47)
HEMOGLOBIN: 10.1 G/DL (ref 12–16)
INR: 1.48
LYMPHOCYTES #: 1.5 10^3/UL (ref 0.8–2.9)
LYMPHOCYTES %: 19.9 % (ref 15–51)
MAGNESIUM: 2.1 MG/DL (ref 1.7–2.5)
MEAN CORPUSCULAR HEMOGLOBIN: 27.7 PG (ref 29–33)
MEAN CORPUSCULAR HGB CONC: 32.2 G/DL (ref 32–37)
MEAN CORPUSCULAR VOLUME: 86 FL (ref 82–101)
MEAN PLATELET VOLUME: 9.2 FL (ref 7.4–10.4)
MONOCYTE #: 0.7 10^3/UL (ref 0.3–0.9)
MONOCYTES %: 9.3 % (ref 0–11)
NEUTROPHIL #: 5.3 10^3/UL (ref 1.6–7.5)
NEUTROPHILS %: 69.6 % (ref 39–77)
NUCLEATED RED BLOOD CELLS #: 0 10^3/UL (ref 0–0)
NUCLEATED RED BLOOD CELLS%: 0 /100WBC (ref 0–0)
PHOSPHORUS: 3.2 MG/DL (ref 2.5–4.9)
PLATELET COUNT: 314 10^3/UL (ref 140–415)
POTASSIUM: 3.7 MMOL/L (ref 3.5–5.1)
PROTIME: 18.2 SEC (ref 11.9–14.9)
PT RATIO: 1.4
RED BLOOD COUNT: 3.65 10^6/UL (ref 4.2–5.4)
RED CELL DISTRIBUTION WIDTH: 13.7 % (ref 11.5–14.5)
SODIUM: 147 MMOL/L (ref 135–144)
WHITE BLOOD COUNT: 7.5 10^3/UL (ref 4.8–10.8)

## 2018-02-26 RX ADMIN — PAROXETINE HYDROCHLORIDE 1 MG: 20 TABLET, FILM COATED ORAL at 09:00

## 2018-02-26 RX ADMIN — PIPERACILLIN SODIUM AND TAZOBACTAM SODIUM 1 MLS/HR: 3; .375 INJECTION, POWDER, LYOPHILIZED, FOR SOLUTION INTRAVENOUS at 21:18

## 2018-02-26 RX ADMIN — LIDOCAINE 1 PATCH: 50 PATCH TOPICAL at 09:34

## 2018-02-26 RX ADMIN — Medication 1 MLS/HR: at 20:14

## 2018-02-26 RX ADMIN — PANTOPRAZOLE SODIUM 1 MG: 40 TABLET, DELAYED RELEASE ORAL at 06:01

## 2018-02-26 RX ADMIN — Medication 1 MLS/HR: at 14:04

## 2018-02-26 RX ADMIN — PIPERACILLIN SODIUM AND TAZOBACTAM SODIUM 1 MLS/HR: 3; .375 INJECTION, POWDER, LYOPHILIZED, FOR SOLUTION INTRAVENOUS at 06:01

## 2018-02-26 RX ADMIN — AMLODIPINE BESYLATE 1 MG: 10 TABLET ORAL at 09:29

## 2018-02-26 RX ADMIN — PIPERACILLIN SODIUM AND TAZOBACTAM SODIUM 1 MLS/HR: 3; .375 INJECTION, POWDER, LYOPHILIZED, FOR SOLUTION INTRAVENOUS at 14:04

## 2018-02-26 RX ADMIN — Medication 1 MLS/HR: at 07:44

## 2018-02-26 RX ADMIN — METHYLPREDNISOLONE SODIUM SUCCINATE 1 MG: 40 INJECTION, POWDER, FOR SOLUTION INTRAMUSCULAR; INTRAVENOUS at 09:28

## 2018-02-26 RX ADMIN — METOPROLOL SUCCINATE 1 MG: 25 TABLET, EXTENDED RELEASE ORAL at 09:30

## 2018-02-26 RX ADMIN — DOCUSATE SODIUM AND SENNOSIDES 1 TAB: 8.6; 5 TABLET, FILM COATED ORAL at 20:13

## 2018-02-26 RX ADMIN — Medication 1 MLS/HR: at 02:50

## 2018-02-26 RX ADMIN — DOCUSATE SODIUM AND SENNOSIDES 1 TAB: 8.6; 5 TABLET, FILM COATED ORAL at 09:29

## 2018-02-26 RX ADMIN — FUROSEMIDE 1 MG: 10 INJECTION, SOLUTION INTRAVENOUS at 09:29

## 2018-02-26 RX ADMIN — ALPRAZOLAM 1 MG: 0.25 TABLET ORAL at 10:30

## 2018-02-26 RX ADMIN — HYDROMORPHONE HYDROCHLORIDE 1 MG: 1 INJECTION, SOLUTION INTRAMUSCULAR; INTRAVENOUS; SUBCUTANEOUS at 00:16

## 2018-02-27 LAB
ADD MAN DIFF?: NO
ALBUMIN: 3.3 G/DL (ref 3.3–4.9)
ANION GAP: 13 (ref 8–16)
BASOPHIL #: 0 10^3/UL (ref 0–0.1)
BASOPHILS %: 0.5 % (ref 0–2)
BLOOD UREA NITROGEN: 16 MG/DL (ref 7–20)
BODY FLD TYPE: (no result)
CALCIUM: 9.5 MG/DL (ref 8.4–10.2)
CARBON DIOXIDE: 36 MMOL/L (ref 21–31)
CHLORIDE: 103 MMOL/L (ref 97–110)
CREATININE: 0.86 MG/DL (ref 0.44–1)
EOSINOPHILS #: 0.1 10^3/UL (ref 0–0.5)
EOSINOPHILS %: 1 % (ref 0–7)
FLD CLARITY: (no result)
FLD COLOR: YELLOW
FLD MN%: 95 %
FLD PMN%: 5 %
FLD RBC: (no result) /UL
FLD TYPE: (no result)
FLD VOLUME: 450 ML
FLD WBC: 415 /CMM
FLUID GLUCOSE: 101 MG/DL
FLUID LD: 1212 U/L
FLUID TOTAL PROTEIN: 3.4 G/DL
FLUID TYPE: (no result)
GLUCOSE: 107 MG/DL (ref 70–220)
HEMATOCRIT: 34.2 % (ref 37–47)
HEMOGLOBIN: 10.5 G/DL (ref 12–16)
LACTATE DEHYDROGENASE: 1212 IU/L (ref 313–618)
LYMPHOCYTES #: 1.6 10^3/UL (ref 0.8–2.9)
LYMPHOCYTES %: 22.4 % (ref 15–51)
Lab: 5 %
Lab: 95 %
MAGNESIUM: 2.2 MG/DL (ref 1.7–2.5)
MEAN CORPUSCULAR HEMOGLOBIN: 26.9 PG (ref 29–33)
MEAN CORPUSCULAR HGB CONC: 30.7 G/DL (ref 32–37)
MEAN CORPUSCULAR VOLUME: 87.5 FL (ref 82–101)
MEAN PLATELET VOLUME: 8.9 FL (ref 7.4–10.4)
MONOCYTE #: 0.6 10^3/UL (ref 0.3–0.9)
MONOCYTES %: 8.6 % (ref 0–11)
NEUTROPHIL #: 4.9 10^3/UL (ref 1.6–7.5)
NEUTROPHILS %: 67.1 % (ref 39–77)
NUCLEATED RED BLOOD CELLS #: 0 10^3/UL (ref 0–0)
NUCLEATED RED BLOOD CELLS%: 0 /100WBC (ref 0–0)
PHOSPHORUS: 3 MG/DL (ref 2.5–4.9)
PLATELET COUNT: 326 10^3/UL (ref 140–415)
POTASSIUM: 3.7 MMOL/L (ref 3.5–5.1)
RED BLOOD COUNT: 3.91 10^6/UL (ref 4.2–5.4)
RED CELL DISTRIBUTION WIDTH: 14 % (ref 11.5–14.5)
SODIUM: 148 MMOL/L (ref 135–144)
TOTAL PROTEIN: 3.4 G/DL (ref 6.1–8.1)
WHITE BLOOD COUNT: 7.3 10^3/UL (ref 4.8–10.8)

## 2018-02-27 PROCEDURE — 0W9B3ZX DRAINAGE OF LEFT PLEURAL CAVITY, PERCUTANEOUS APPROACH, DIAGNOSTIC: ICD-10-PCS

## 2018-02-27 RX ADMIN — WARFARIN SODIUM 1 MG: 2 TABLET ORAL at 17:32

## 2018-02-27 RX ADMIN — PANTOPRAZOLE SODIUM 1 MG: 40 TABLET, DELAYED RELEASE ORAL at 05:26

## 2018-02-27 RX ADMIN — DOCUSATE SODIUM AND SENNOSIDES 1 TAB: 8.6; 5 TABLET, FILM COATED ORAL at 08:41

## 2018-02-27 RX ADMIN — METHADONE HYDROCHLORIDE 1 MG: 5 SOLUTION ORAL at 10:26

## 2018-02-27 RX ADMIN — ONDANSETRON HYDROCHLORIDE 1 MG: 2 INJECTION, SOLUTION INTRAMUSCULAR; INTRAVENOUS at 00:37

## 2018-02-27 RX ADMIN — Medication 1 MLS/HR: at 19:54

## 2018-02-27 RX ADMIN — DOCUSATE SODIUM AND SENNOSIDES 1 TAB: 8.6; 5 TABLET, FILM COATED ORAL at 19:54

## 2018-02-27 RX ADMIN — Medication 1 MLS/HR: at 08:39

## 2018-02-27 RX ADMIN — Medication 1 MLS/HR: at 01:56

## 2018-02-27 RX ADMIN — LIDOCAINE 1 PATCH: 50 PATCH TOPICAL at 08:42

## 2018-02-27 RX ADMIN — LIDOCAINE HYDROCHLORIDE 1 ML: 10 INJECTION, SOLUTION EPIDURAL; INFILTRATION; INTRACAUDAL; PERINEURAL at 09:45

## 2018-02-27 RX ADMIN — PIPERACILLIN SODIUM AND TAZOBACTAM SODIUM 1 MLS/HR: 3; .375 INJECTION, POWDER, LYOPHILIZED, FOR SOLUTION INTRAVENOUS at 22:23

## 2018-02-27 RX ADMIN — MAGNESIUM HYDROXIDE 1 ML: 400 SUSPENSION ORAL at 03:49

## 2018-02-27 RX ADMIN — METOPROLOL SUCCINATE 1 MG: 25 TABLET, EXTENDED RELEASE ORAL at 08:40

## 2018-02-27 RX ADMIN — HYDROMORPHONE HYDROCHLORIDE 1 MG: 1 INJECTION, SOLUTION INTRAMUSCULAR; INTRAVENOUS; SUBCUTANEOUS at 00:33

## 2018-02-27 RX ADMIN — PIPERACILLIN SODIUM AND TAZOBACTAM SODIUM 1 MLS/HR: 3; .375 INJECTION, POWDER, LYOPHILIZED, FOR SOLUTION INTRAVENOUS at 05:27

## 2018-02-27 RX ADMIN — AMLODIPINE BESYLATE 1 MG: 10 TABLET ORAL at 08:41

## 2018-02-27 RX ADMIN — Medication 1 MLS/HR: at 13:38

## 2018-02-27 RX ADMIN — ONDANSETRON HYDROCHLORIDE 1 MG: 2 INJECTION, SOLUTION INTRAMUSCULAR; INTRAVENOUS at 07:38

## 2018-02-27 RX ADMIN — METHADONE HYDROCHLORIDE 1 MG: 5 SOLUTION ORAL at 19:54

## 2018-02-27 RX ADMIN — FUROSEMIDE 1 MG: 10 INJECTION, SOLUTION INTRAVENOUS at 08:39

## 2018-02-27 RX ADMIN — METHYLPREDNISOLONE SODIUM SUCCINATE 1 MG: 40 INJECTION, POWDER, FOR SOLUTION INTRAMUSCULAR; INTRAVENOUS at 08:39

## 2018-02-27 RX ADMIN — HYDROMORPHONE HYDROCHLORIDE 1 MG: 1 INJECTION, SOLUTION INTRAMUSCULAR; INTRAVENOUS; SUBCUTANEOUS at 03:45

## 2018-02-27 RX ADMIN — PAROXETINE HYDROCHLORIDE 1 MG: 20 TABLET, FILM COATED ORAL at 08:41

## 2018-02-27 RX ADMIN — ALPRAZOLAM 1 MG: 0.25 TABLET ORAL at 09:03

## 2018-02-27 RX ADMIN — PIPERACILLIN SODIUM AND TAZOBACTAM SODIUM 1 MLS/HR: 3; .375 INJECTION, POWDER, LYOPHILIZED, FOR SOLUTION INTRAVENOUS at 13:38

## 2018-02-28 LAB
ADD MAN DIFF?: NO
ANION GAP: 11 (ref 8–16)
BASOPHIL #: 0.1 10^3/UL (ref 0–0.1)
BASOPHILS %: 0.7 % (ref 0–2)
BLOOD UREA NITROGEN: 15 MG/DL (ref 7–20)
CALCIUM: 9.2 MG/DL (ref 8.4–10.2)
CARBON DIOXIDE: 36 MMOL/L (ref 21–31)
CHLORIDE: 102 MMOL/L (ref 97–110)
CREATININE: 0.88 MG/DL (ref 0.44–1)
EOSINOPHILS #: 0.1 10^3/UL (ref 0–0.5)
EOSINOPHILS %: 1.2 % (ref 0–7)
GLUCOSE: 94 MG/DL (ref 70–220)
HEMATOCRIT: 35.4 % (ref 37–47)
HEMOGLOBIN: 11 G/DL (ref 12–16)
INR: 1.12
LYMPHOCYTES #: 1.9 10^3/UL (ref 0.8–2.9)
LYMPHOCYTES %: 26.4 % (ref 15–51)
MAGNESIUM: 2.1 MG/DL (ref 1.7–2.5)
MEAN CORPUSCULAR HEMOGLOBIN: 27 PG (ref 29–33)
MEAN CORPUSCULAR HGB CONC: 31.1 G/DL (ref 32–37)
MEAN CORPUSCULAR VOLUME: 87 FL (ref 82–101)
MEAN PLATELET VOLUME: 8.8 FL (ref 7.4–10.4)
MONOCYTE #: 0.6 10^3/UL (ref 0.3–0.9)
MONOCYTES %: 7.9 % (ref 0–11)
NEUTROPHIL #: 4.6 10^3/UL (ref 1.6–7.5)
NEUTROPHILS %: 63.4 % (ref 39–77)
NUCLEATED RED BLOOD CELLS #: 0 10^3/UL (ref 0–0)
NUCLEATED RED BLOOD CELLS%: 0 /100WBC (ref 0–0)
PHOSPHORUS: 3.1 MG/DL (ref 2.5–4.9)
PLATELET COUNT: 302 10^3/UL (ref 140–415)
POTASSIUM: 3.5 MMOL/L (ref 3.5–5.1)
PROTIME: 14.6 SEC (ref 11.9–14.9)
PT RATIO: 1.1
RED BLOOD COUNT: 4.07 10^6/UL (ref 4.2–5.4)
RED CELL DISTRIBUTION WIDTH: 13.7 % (ref 11.5–14.5)
SODIUM: 145 MMOL/L (ref 135–144)
WHITE BLOOD COUNT: 7.2 10^3/UL (ref 4.8–10.8)

## 2018-02-28 RX ADMIN — PIPERACILLIN SODIUM AND TAZOBACTAM SODIUM 1 MLS/HR: 3; .375 INJECTION, POWDER, LYOPHILIZED, FOR SOLUTION INTRAVENOUS at 14:25

## 2018-02-28 RX ADMIN — Medication 1 MLS/HR: at 13:43

## 2018-02-28 RX ADMIN — METHYLPREDNISOLONE SODIUM SUCCINATE 1 MG: 40 INJECTION, POWDER, FOR SOLUTION INTRAMUSCULAR; INTRAVENOUS at 20:54

## 2018-02-28 RX ADMIN — METHADONE HYDROCHLORIDE 1 MG: 5 SOLUTION ORAL at 18:16

## 2018-02-28 RX ADMIN — METOPROLOL SUCCINATE 1 MG: 25 TABLET, EXTENDED RELEASE ORAL at 08:35

## 2018-02-28 RX ADMIN — ENOXAPARIN SODIUM 1 MG: 100 INJECTION SUBCUTANEOUS at 20:59

## 2018-02-28 RX ADMIN — PAROXETINE HYDROCHLORIDE 1 MG: 20 TABLET, FILM COATED ORAL at 08:35

## 2018-02-28 RX ADMIN — AMLODIPINE BESYLATE 1 MG: 10 TABLET ORAL at 08:35

## 2018-02-28 RX ADMIN — FUROSEMIDE 1 MG: 10 INJECTION, SOLUTION INTRAVENOUS at 08:34

## 2018-02-28 RX ADMIN — Medication 1 MLS/HR: at 08:30

## 2018-02-28 RX ADMIN — LISINOPRIL 1 MG: 10 TABLET ORAL at 16:27

## 2018-02-28 RX ADMIN — METHYLPREDNISOLONE SODIUM SUCCINATE 1 MG: 40 INJECTION, POWDER, FOR SOLUTION INTRAMUSCULAR; INTRAVENOUS at 08:34

## 2018-02-28 RX ADMIN — WARFARIN SODIUM 1 MG: 2 TABLET ORAL at 16:26

## 2018-02-28 RX ADMIN — PANTOPRAZOLE SODIUM 1 MG: 40 TABLET, DELAYED RELEASE ORAL at 05:57

## 2018-02-28 RX ADMIN — DOCUSATE SODIUM AND SENNOSIDES 1 TAB: 8.6; 5 TABLET, FILM COATED ORAL at 08:34

## 2018-02-28 RX ADMIN — IPRATROPIUM BROMIDE AND ALBUTEROL SULFATE 1 ML: .5; 3 SOLUTION RESPIRATORY (INHALATION) at 19:53

## 2018-02-28 RX ADMIN — Medication 1 MLS/HR: at 02:06

## 2018-02-28 RX ADMIN — METHADONE HYDROCHLORIDE 1 MG: 5 SOLUTION ORAL at 02:07

## 2018-02-28 RX ADMIN — HYDROMORPHONE HYDROCHLORIDE 1 MG: 1 INJECTION, SOLUTION INTRAMUSCULAR; INTRAVENOUS; SUBCUTANEOUS at 05:54

## 2018-02-28 RX ADMIN — METHADONE HYDROCHLORIDE 1 MG: 5 SOLUTION ORAL at 11:11

## 2018-02-28 RX ADMIN — DOCUSATE SODIUM AND SENNOSIDES 1 TAB: 8.6; 5 TABLET, FILM COATED ORAL at 20:58

## 2018-02-28 RX ADMIN — PIPERACILLIN SODIUM AND TAZOBACTAM SODIUM 1 MLS/HR: 3; .375 INJECTION, POWDER, LYOPHILIZED, FOR SOLUTION INTRAVENOUS at 05:57

## 2018-02-28 RX ADMIN — Medication 1 MLS/HR: at 20:53

## 2018-03-01 LAB
ADD MAN DIFF?: NO
ANION GAP: 13 (ref 8–16)
BASOPHIL #: 0 10^3/UL (ref 0–0.1)
BASOPHILS %: 0.1 % (ref 0–2)
BLOOD UREA NITROGEN: 12 MG/DL (ref 7–20)
CALCIUM: 9.6 MG/DL (ref 8.4–10.2)
CARBON DIOXIDE: 32 MMOL/L (ref 21–31)
CHLORIDE: 103 MMOL/L (ref 97–110)
CREATININE: 0.75 MG/DL (ref 0.44–1)
EOSINOPHILS #: 0 10^3/UL (ref 0–0.5)
EOSINOPHILS %: 0 % (ref 0–7)
GLUCOSE: 117 MG/DL (ref 70–220)
HEMATOCRIT: 37.8 % (ref 37–47)
HEMOGLOBIN: 11.9 G/DL (ref 12–16)
INR: 1.23
LYMPHOCYTES #: 1.2 10^3/UL (ref 0.8–2.9)
LYMPHOCYTES %: 15.8 % (ref 15–51)
MAGNESIUM: 2.1 MG/DL (ref 1.7–2.5)
MEAN CORPUSCULAR HEMOGLOBIN: 27.2 PG (ref 29–33)
MEAN CORPUSCULAR HGB CONC: 31.5 G/DL (ref 32–37)
MEAN CORPUSCULAR VOLUME: 86.5 FL (ref 82–101)
MEAN PLATELET VOLUME: 9 FL (ref 7.4–10.4)
MONOCYTE #: 0.3 10^3/UL (ref 0.3–0.9)
MONOCYTES %: 4.1 % (ref 0–11)
NEUTROPHIL #: 5.9 10^3/UL (ref 1.6–7.5)
NEUTROPHILS %: 79.6 % (ref 39–77)
NUCLEATED RED BLOOD CELLS #: 0 10^3/UL (ref 0–0)
NUCLEATED RED BLOOD CELLS%: 0 /100WBC (ref 0–0)
PHOSPHORUS: 3.7 MG/DL (ref 2.5–4.9)
PLATELET COUNT: 354 10^3/UL (ref 140–415)
POTASSIUM: 3.8 MMOL/L (ref 3.5–5.1)
PROTIME: 15.7 SEC (ref 11.9–14.9)
PT RATIO: 1.2
RED BLOOD COUNT: 4.37 10^6/UL (ref 4.2–5.4)
RED CELL DISTRIBUTION WIDTH: 13.7 % (ref 11.5–14.5)
SODIUM: 144 MMOL/L (ref 135–144)
WHITE BLOOD COUNT: 7.4 10^3/UL (ref 4.8–10.8)

## 2018-03-01 RX ADMIN — ENOXAPARIN SODIUM 1 MG: 100 INJECTION SUBCUTANEOUS at 21:26

## 2018-03-01 RX ADMIN — METHADONE HYDROCHLORIDE 1 MG: 5 SOLUTION ORAL at 10:33

## 2018-03-01 RX ADMIN — Medication 1 MLS/HR: at 02:27

## 2018-03-01 RX ADMIN — METOPROLOL SUCCINATE 1 MG: 25 TABLET, EXTENDED RELEASE ORAL at 09:11

## 2018-03-01 RX ADMIN — METHADONE HYDROCHLORIDE 1 MG: 5 SOLUTION ORAL at 02:27

## 2018-03-01 RX ADMIN — AMLODIPINE BESYLATE 1 MG: 10 TABLET ORAL at 09:10

## 2018-03-01 RX ADMIN — IPRATROPIUM BROMIDE AND ALBUTEROL SULFATE 1 ML: .5; 3 SOLUTION RESPIRATORY (INHALATION) at 20:23

## 2018-03-01 RX ADMIN — LISINOPRIL 1 MG: 10 TABLET ORAL at 10:33

## 2018-03-01 RX ADMIN — ENOXAPARIN SODIUM 1 MG: 100 INJECTION SUBCUTANEOUS at 09:17

## 2018-03-01 RX ADMIN — HYDROMORPHONE HYDROCHLORIDE 1 MG: 1 INJECTION, SOLUTION INTRAMUSCULAR; INTRAVENOUS; SUBCUTANEOUS at 04:05

## 2018-03-01 RX ADMIN — FUROSEMIDE 1 MG: 40 TABLET ORAL at 09:10

## 2018-03-01 RX ADMIN — LISINOPRIL 1 MG: 10 TABLET ORAL at 09:12

## 2018-03-01 RX ADMIN — Medication 1 MLS/HR: at 21:14

## 2018-03-01 RX ADMIN — Medication 1 MLS/HR: at 08:02

## 2018-03-01 RX ADMIN — DOCUSATE SODIUM AND SENNOSIDES 1 TAB: 8.6; 5 TABLET, FILM COATED ORAL at 21:15

## 2018-03-01 RX ADMIN — WARFARIN SODIUM 1 MG: 2 TABLET ORAL at 16:18

## 2018-03-01 RX ADMIN — PAROXETINE HYDROCHLORIDE 1 MG: 20 TABLET, FILM COATED ORAL at 09:10

## 2018-03-01 RX ADMIN — METHADONE HYDROCHLORIDE 1 MG: 5 SOLUTION ORAL at 18:40

## 2018-03-01 RX ADMIN — IPRATROPIUM BROMIDE AND ALBUTEROL SULFATE 1 ML: .5; 3 SOLUTION RESPIRATORY (INHALATION) at 14:40

## 2018-03-01 RX ADMIN — PANTOPRAZOLE SODIUM 1 MG: 40 TABLET, DELAYED RELEASE ORAL at 06:52

## 2018-03-01 RX ADMIN — Medication 1 MLS/HR: at 13:07

## 2018-03-01 RX ADMIN — METHYLPREDNISOLONE SODIUM SUCCINATE 1 MG: 40 INJECTION, POWDER, FOR SOLUTION INTRAMUSCULAR; INTRAVENOUS at 21:14

## 2018-03-01 RX ADMIN — DOCUSATE SODIUM AND SENNOSIDES 1 TAB: 8.6; 5 TABLET, FILM COATED ORAL at 09:10

## 2018-03-01 RX ADMIN — IPRATROPIUM BROMIDE AND ALBUTEROL SULFATE 1 ML: .5; 3 SOLUTION RESPIRATORY (INHALATION) at 08:13

## 2018-03-01 RX ADMIN — METHYLPREDNISOLONE SODIUM SUCCINATE 1 MG: 40 INJECTION, POWDER, FOR SOLUTION INTRAMUSCULAR; INTRAVENOUS at 09:09

## 2018-03-02 LAB
INR: 1.45
PROTIME: 17.9 SEC (ref 11.9–14.9)
PT RATIO: 1.4

## 2018-03-02 RX ADMIN — PANTOPRAZOLE SODIUM 1 MG: 40 TABLET, DELAYED RELEASE ORAL at 06:20

## 2018-03-02 RX ADMIN — DOCUSATE SODIUM AND SENNOSIDES 1 TAB: 8.6; 5 TABLET, FILM COATED ORAL at 09:04

## 2018-03-02 RX ADMIN — ENOXAPARIN SODIUM 1 MG: 100 INJECTION SUBCUTANEOUS at 09:09

## 2018-03-02 RX ADMIN — Medication 1 MLS/HR: at 02:01

## 2018-03-02 RX ADMIN — PAROXETINE HYDROCHLORIDE 1 MG: 20 TABLET, FILM COATED ORAL at 09:01

## 2018-03-02 RX ADMIN — IPRATROPIUM BROMIDE AND ALBUTEROL SULFATE 1 ML: .5; 3 SOLUTION RESPIRATORY (INHALATION) at 07:44

## 2018-03-02 RX ADMIN — MORPHINE SULFATE 1 MG: 2 INJECTION, SOLUTION INTRAMUSCULAR; INTRAVENOUS at 22:21

## 2018-03-02 RX ADMIN — METHADONE HYDROCHLORIDE 1 MG: 5 SOLUTION ORAL at 11:18

## 2018-03-02 RX ADMIN — ENOXAPARIN SODIUM 1 MG: 100 INJECTION SUBCUTANEOUS at 20:18

## 2018-03-02 RX ADMIN — METHYLPREDNISOLONE SODIUM SUCCINATE 1 MG: 40 INJECTION, POWDER, FOR SOLUTION INTRAMUSCULAR; INTRAVENOUS at 09:01

## 2018-03-02 RX ADMIN — FUROSEMIDE 1 MG: 40 TABLET ORAL at 09:03

## 2018-03-02 RX ADMIN — METOPROLOL SUCCINATE 1 MG: 25 TABLET, EXTENDED RELEASE ORAL at 09:02

## 2018-03-02 RX ADMIN — LISINOPRIL 1 MG: 20 TABLET ORAL at 09:03

## 2018-03-02 RX ADMIN — IPRATROPIUM BROMIDE AND ALBUTEROL SULFATE 1 ML: .5; 3 SOLUTION RESPIRATORY (INHALATION) at 20:20

## 2018-03-02 RX ADMIN — AMLODIPINE BESYLATE 1 MG: 10 TABLET ORAL at 09:03

## 2018-03-02 RX ADMIN — METHADONE HYDROCHLORIDE 1 MG: 5 SOLUTION ORAL at 02:52

## 2018-03-02 RX ADMIN — DOCUSATE SODIUM AND SENNOSIDES 1 TAB: 8.6; 5 TABLET, FILM COATED ORAL at 20:10

## 2018-03-02 RX ADMIN — METHADONE HYDROCHLORIDE 1 MG: 5 SOLUTION ORAL at 20:10

## 2018-03-02 RX ADMIN — Medication 1 MLS/HR: at 15:04

## 2018-03-02 RX ADMIN — METHYLPREDNISOLONE SODIUM SUCCINATE 1 MG: 40 INJECTION, POWDER, FOR SOLUTION INTRAMUSCULAR; INTRAVENOUS at 20:11

## 2018-03-02 RX ADMIN — WARFARIN SODIUM 1 MG: 5 TABLET ORAL at 16:47

## 2018-03-02 RX ADMIN — Medication 1 MLS/HR: at 09:00

## 2018-03-02 RX ADMIN — Medication 1 MLS/HR: at 20:10

## 2018-03-02 RX ADMIN — IPRATROPIUM BROMIDE AND ALBUTEROL SULFATE 1 ML: .5; 3 SOLUTION RESPIRATORY (INHALATION) at 14:04

## 2018-03-03 LAB
INR: 1.71
PROTIME: 20.4 SEC (ref 11.9–14.9)
PT RATIO: 1.6

## 2018-03-03 RX ADMIN — LISINOPRIL 1 MG: 20 TABLET ORAL at 09:15

## 2018-03-03 RX ADMIN — METOPROLOL SUCCINATE 1 MG: 25 TABLET, EXTENDED RELEASE ORAL at 09:15

## 2018-03-03 RX ADMIN — DOCUSATE SODIUM AND SENNOSIDES 1 TAB: 8.6; 5 TABLET, FILM COATED ORAL at 20:39

## 2018-03-03 RX ADMIN — WARFARIN SODIUM 1 MG: 5 TABLET ORAL at 18:15

## 2018-03-03 RX ADMIN — METHADONE HYDROCHLORIDE 1 MG: 5 SOLUTION ORAL at 18:15

## 2018-03-03 RX ADMIN — Medication 1 MLS/HR: at 20:34

## 2018-03-03 RX ADMIN — IPRATROPIUM BROMIDE AND ALBUTEROL SULFATE 1 ML: .5; 3 SOLUTION RESPIRATORY (INHALATION) at 14:36

## 2018-03-03 RX ADMIN — Medication 1 MLS/HR: at 02:26

## 2018-03-03 RX ADMIN — AMLODIPINE BESYLATE 1 MG: 10 TABLET ORAL at 09:16

## 2018-03-03 RX ADMIN — METHYLPREDNISOLONE SODIUM SUCCINATE 1 MG: 40 INJECTION, POWDER, FOR SOLUTION INTRAMUSCULAR; INTRAVENOUS at 20:38

## 2018-03-03 RX ADMIN — METHADONE HYDROCHLORIDE 1 MG: 5 SOLUTION ORAL at 02:26

## 2018-03-03 RX ADMIN — Medication 1 MLS/HR: at 08:00

## 2018-03-03 RX ADMIN — ENOXAPARIN SODIUM 1 MG: 100 INJECTION SUBCUTANEOUS at 09:15

## 2018-03-03 RX ADMIN — DOCUSATE SODIUM AND SENNOSIDES 1 TAB: 8.6; 5 TABLET, FILM COATED ORAL at 09:16

## 2018-03-03 RX ADMIN — IPRATROPIUM BROMIDE AND ALBUTEROL SULFATE 1 ML: .5; 3 SOLUTION RESPIRATORY (INHALATION) at 20:41

## 2018-03-03 RX ADMIN — Medication 1 MLS/HR: at 15:15

## 2018-03-03 RX ADMIN — PANTOPRAZOLE SODIUM 1 MG: 40 TABLET, DELAYED RELEASE ORAL at 06:19

## 2018-03-03 RX ADMIN — METHADONE HYDROCHLORIDE 1 MG: 5 SOLUTION ORAL at 11:32

## 2018-03-03 RX ADMIN — IPRATROPIUM BROMIDE AND ALBUTEROL SULFATE 1 ML: .5; 3 SOLUTION RESPIRATORY (INHALATION) at 08:29

## 2018-03-03 RX ADMIN — FUROSEMIDE 1 MG: 40 TABLET ORAL at 09:14

## 2018-03-03 RX ADMIN — PAROXETINE HYDROCHLORIDE 1 MG: 20 TABLET, FILM COATED ORAL at 09:16

## 2018-03-03 RX ADMIN — ENOXAPARIN SODIUM 1 MG: 100 INJECTION SUBCUTANEOUS at 20:35

## 2018-03-03 RX ADMIN — METHYLPREDNISOLONE SODIUM SUCCINATE 1 MG: 40 INJECTION, POWDER, FOR SOLUTION INTRAMUSCULAR; INTRAVENOUS at 09:14

## 2018-03-04 LAB
INR: 2.17
PROTIME: 24.7 SEC (ref 11.9–14.9)
PT RATIO: 1.9

## 2018-03-04 RX ADMIN — WARFARIN SODIUM 1 MG: 2 TABLET ORAL at 18:30

## 2018-03-04 RX ADMIN — IPRATROPIUM BROMIDE AND ALBUTEROL SULFATE 1 ML: .5; 3 SOLUTION RESPIRATORY (INHALATION) at 08:28

## 2018-03-04 RX ADMIN — PANTOPRAZOLE SODIUM 1 MG: 40 TABLET, DELAYED RELEASE ORAL at 05:34

## 2018-03-04 RX ADMIN — METHADONE HYDROCHLORIDE 1 MG: 5 SOLUTION ORAL at 18:30

## 2018-03-04 RX ADMIN — FUROSEMIDE 1 MG: 40 TABLET ORAL at 09:24

## 2018-03-04 RX ADMIN — IPRATROPIUM BROMIDE AND ALBUTEROL SULFATE 1 ML: .5; 3 SOLUTION RESPIRATORY (INHALATION) at 19:57

## 2018-03-04 RX ADMIN — METOPROLOL SUCCINATE 1 MG: 50 TABLET, EXTENDED RELEASE ORAL at 09:23

## 2018-03-04 RX ADMIN — IPRATROPIUM BROMIDE AND ALBUTEROL SULFATE 1 ML: .5; 3 SOLUTION RESPIRATORY (INHALATION) at 14:47

## 2018-03-04 RX ADMIN — Medication 1 MLS/HR: at 01:39

## 2018-03-04 RX ADMIN — PAROXETINE HYDROCHLORIDE 1 MG: 20 TABLET, FILM COATED ORAL at 09:00

## 2018-03-04 RX ADMIN — Medication 1 MLS/HR: at 21:05

## 2018-03-04 RX ADMIN — LISINOPRIL 1 MG: 20 TABLET ORAL at 09:24

## 2018-03-04 RX ADMIN — AMLODIPINE BESYLATE 1 MG: 10 TABLET ORAL at 09:25

## 2018-03-04 RX ADMIN — DOCUSATE SODIUM AND SENNOSIDES 1 TAB: 8.6; 5 TABLET, FILM COATED ORAL at 21:04

## 2018-03-04 RX ADMIN — DOCUSATE SODIUM AND SENNOSIDES 1 TAB: 8.6; 5 TABLET, FILM COATED ORAL at 09:26

## 2018-03-04 RX ADMIN — METHADONE HYDROCHLORIDE 1 MG: 5 SOLUTION ORAL at 12:03

## 2018-03-04 RX ADMIN — Medication 1 MLS/HR: at 09:18

## 2018-03-04 RX ADMIN — Medication 1 MLS/HR: at 14:09

## 2018-03-04 RX ADMIN — METHADONE HYDROCHLORIDE 1 MG: 5 SOLUTION ORAL at 05:35

## 2018-03-05 LAB
ADD MAN DIFF?: NO
ANION GAP: 13 (ref 8–16)
BASOPHIL #: 0 10^3/UL (ref 0–0.1)
BASOPHILS %: 0.1 % (ref 0–2)
BLOOD UREA NITROGEN: 20 MG/DL (ref 7–20)
CALCIUM: 9.8 MG/DL (ref 8.4–10.2)
CARBON DIOXIDE: 31 MMOL/L (ref 21–31)
CHLORIDE: 104 MMOL/L (ref 97–110)
CREATININE: 0.81 MG/DL (ref 0.44–1)
EOSINOPHILS #: 0.1 10^3/UL (ref 0–0.5)
EOSINOPHILS %: 0.4 % (ref 0–7)
GLUCOSE: 94 MG/DL (ref 70–220)
HEMATOCRIT: 38.1 % (ref 37–47)
HEMOGLOBIN: 12 G/DL (ref 12–16)
INR: 2.88
LYMPHOCYTES #: 2.7 10^3/UL (ref 0.8–2.9)
LYMPHOCYTES %: 20.9 % (ref 15–51)
MAGNESIUM: 2.2 MG/DL (ref 1.7–2.5)
MEAN CORPUSCULAR HEMOGLOBIN: 27 PG (ref 29–33)
MEAN CORPUSCULAR HGB CONC: 31.5 G/DL (ref 32–37)
MEAN CORPUSCULAR VOLUME: 85.6 FL (ref 82–101)
MEAN PLATELET VOLUME: 9.1 FL (ref 7.4–10.4)
MONOCYTE #: 1.1 10^3/UL (ref 0.3–0.9)
MONOCYTES %: 8.3 % (ref 0–11)
NEUTROPHIL #: 9.2 10^3/UL (ref 1.6–7.5)
NEUTROPHILS %: 69.8 % (ref 39–77)
NUCLEATED RED BLOOD CELLS #: 0 10^3/UL (ref 0–0)
NUCLEATED RED BLOOD CELLS%: 0 /100WBC (ref 0–0)
PHOSPHORUS: 4.3 MG/DL (ref 2.5–4.9)
PLATELET COUNT: 353 10^3/UL (ref 140–415)
POTASSIUM: 3.6 MMOL/L (ref 3.5–5.1)
PROTIME: 31 SEC (ref 11.9–14.9)
PT RATIO: 2.4
RED BLOOD COUNT: 4.45 10^6/UL (ref 4.2–5.4)
RED CELL DISTRIBUTION WIDTH: 14.9 % (ref 11.5–14.5)
SODIUM: 144 MMOL/L (ref 135–144)
WHITE BLOOD COUNT: 13.1 10^3/UL (ref 4.8–10.8)

## 2018-03-05 RX ADMIN — FUROSEMIDE 1 MG: 40 TABLET ORAL at 08:40

## 2018-03-05 RX ADMIN — METHADONE HYDROCHLORIDE 1 MG: 5 SOLUTION ORAL at 03:00

## 2018-03-05 RX ADMIN — Medication 1 MLS/HR: at 14:36

## 2018-03-05 RX ADMIN — WARFARIN SODIUM 1 MG: 2 TABLET ORAL at 17:27

## 2018-03-05 RX ADMIN — IPRATROPIUM BROMIDE AND ALBUTEROL SULFATE 1 ML: .5; 3 SOLUTION RESPIRATORY (INHALATION) at 20:02

## 2018-03-05 RX ADMIN — Medication 1 MLS/HR: at 02:29

## 2018-03-05 RX ADMIN — Medication 1 MLS/HR: at 21:01

## 2018-03-05 RX ADMIN — AMLODIPINE BESYLATE 1 MG: 10 TABLET ORAL at 08:40

## 2018-03-05 RX ADMIN — LISINOPRIL 1 MG: 20 TABLET ORAL at 08:41

## 2018-03-05 RX ADMIN — PAROXETINE HYDROCHLORIDE 1 MG: 20 TABLET, FILM COATED ORAL at 08:41

## 2018-03-05 RX ADMIN — DOCUSATE SODIUM AND SENNOSIDES 1 TAB: 8.6; 5 TABLET, FILM COATED ORAL at 21:01

## 2018-03-05 RX ADMIN — IPRATROPIUM BROMIDE AND ALBUTEROL SULFATE 1 ML: .5; 3 SOLUTION RESPIRATORY (INHALATION) at 13:25

## 2018-03-05 RX ADMIN — METHADONE HYDROCHLORIDE 1 MG: 5 SOLUTION ORAL at 19:39

## 2018-03-05 RX ADMIN — Medication 1 MLS/HR: at 08:38

## 2018-03-05 RX ADMIN — PANTOPRAZOLE SODIUM 1 MG: 40 TABLET, DELAYED RELEASE ORAL at 06:30

## 2018-03-05 RX ADMIN — METHADONE HYDROCHLORIDE 1 MG: 5 SOLUTION ORAL at 11:56

## 2018-03-05 RX ADMIN — IPRATROPIUM BROMIDE AND ALBUTEROL SULFATE 1 ML: .5; 3 SOLUTION RESPIRATORY (INHALATION) at 07:25

## 2018-03-05 RX ADMIN — DOCUSATE SODIUM AND SENNOSIDES 1 TAB: 8.6; 5 TABLET, FILM COATED ORAL at 08:40

## 2018-03-05 RX ADMIN — METOPROLOL SUCCINATE 1 MG: 50 TABLET, EXTENDED RELEASE ORAL at 08:39

## 2018-03-06 LAB
INR: 3.04
PROTIME: 32.4 SEC (ref 11.9–14.9)
PT RATIO: 2.5

## 2018-03-06 RX ADMIN — FUROSEMIDE 1 MG: 40 TABLET ORAL at 08:39

## 2018-03-06 RX ADMIN — AMLODIPINE BESYLATE 1 MG: 10 TABLET ORAL at 08:39

## 2018-03-06 RX ADMIN — METOPROLOL SUCCINATE 1 MG: 50 TABLET, EXTENDED RELEASE ORAL at 08:39

## 2018-03-06 RX ADMIN — METHADONE HYDROCHLORIDE 1 MG: 5 SOLUTION ORAL at 12:22

## 2018-03-06 RX ADMIN — Medication 1 MLS/HR: at 08:42

## 2018-03-06 RX ADMIN — Medication 1 MLS/HR: at 02:42

## 2018-03-06 RX ADMIN — PAROXETINE HYDROCHLORIDE 1 MG: 20 TABLET, FILM COATED ORAL at 08:39

## 2018-03-06 RX ADMIN — PANTOPRAZOLE SODIUM 1 MG: 40 TABLET, DELAYED RELEASE ORAL at 05:42

## 2018-03-06 RX ADMIN — LISINOPRIL 1 MG: 20 TABLET ORAL at 08:40

## 2018-03-06 RX ADMIN — DOCUSATE SODIUM AND SENNOSIDES 1 TAB: 8.6; 5 TABLET, FILM COATED ORAL at 08:39

## 2018-03-06 RX ADMIN — METHADONE HYDROCHLORIDE 1 MG: 5 SOLUTION ORAL at 02:43

## 2018-03-06 RX ADMIN — IPRATROPIUM BROMIDE AND ALBUTEROL SULFATE 1 ML: .5; 3 SOLUTION RESPIRATORY (INHALATION) at 08:34

## 2018-03-21 ENCOUNTER — HOSPITAL ENCOUNTER (INPATIENT)
Age: 68
LOS: 6 days | Discharge: HOME | DRG: 189 | End: 2018-03-27

## 2018-03-21 ENCOUNTER — HOSPITAL ENCOUNTER (INPATIENT)
Dept: HOSPITAL 91 - MS4 | Age: 68
LOS: 6 days | Discharge: HOME | DRG: 189 | End: 2018-03-27
Payer: MEDICARE

## 2018-03-21 DIAGNOSIS — Z79.01: ICD-10-CM

## 2018-03-21 DIAGNOSIS — J96.00: Primary | ICD-10-CM

## 2018-03-21 DIAGNOSIS — C78.7: ICD-10-CM

## 2018-03-21 DIAGNOSIS — C34.90: ICD-10-CM

## 2018-03-21 DIAGNOSIS — Z66: ICD-10-CM

## 2018-03-21 DIAGNOSIS — R60.9: ICD-10-CM

## 2018-03-21 DIAGNOSIS — F41.9: ICD-10-CM

## 2018-03-21 DIAGNOSIS — R60.1: ICD-10-CM

## 2018-03-21 DIAGNOSIS — D68.2: ICD-10-CM

## 2018-03-21 DIAGNOSIS — I42.1: ICD-10-CM

## 2018-03-21 DIAGNOSIS — R10.11: ICD-10-CM

## 2018-03-21 DIAGNOSIS — R79.1: ICD-10-CM

## 2018-03-21 DIAGNOSIS — G89.29: ICD-10-CM

## 2018-03-21 DIAGNOSIS — M54.9: ICD-10-CM

## 2018-03-21 LAB
ADD MAN DIFF?: NO
ADD UMIC: YES
ALANINE AMINOTRANSFERASE: 29 IU/L (ref 13–69)
ALBUMIN/GLOBULIN RATIO: 0.94
ALBUMIN: 3.6 G/DL (ref 3.3–4.9)
ALKALINE PHOSPHATASE: 169 IU/L (ref 42–121)
AMYLASE: 51 U/L (ref 11–123)
ANION GAP: 15 (ref 8–16)
ASPARTATE AMINO TRANSFERASE: 31 IU/L (ref 15–46)
B-TYPE NATRIURETIC PEPTIDE: 488 PG/ML (ref 0–125)
BASOPHIL #: 0 10^3/UL (ref 0–0.1)
BASOPHILS %: 0.3 % (ref 0–2)
BILIRUBIN,DIRECT: 0 MG/DL (ref 0–0.2)
BILIRUBIN,TOTAL: 0.2 MG/DL (ref 0.2–1.3)
BLOOD UREA NITROGEN: 16 MG/DL (ref 7–20)
CALCIUM: 9.5 MG/DL (ref 8.4–10.2)
CARBON DIOXIDE: 29 MMOL/L (ref 21–31)
CHLORIDE: 101 MMOL/L (ref 97–110)
CREATININE: 0.92 MG/DL (ref 0.44–1)
EOSINOPHILS #: 0 10^3/UL (ref 0–0.5)
EOSINOPHILS %: 0.2 % (ref 0–7)
GLOBULIN: 3.8 G/DL (ref 1.3–3.2)
GLUCOSE: 105 MG/DL (ref 70–220)
HEMATOCRIT: 36.1 % (ref 37–47)
HEMOGLOBIN: 11.6 G/DL (ref 12–16)
INR: 5.27
INR: 5.63
LACTIC ACID: 1.2 MMOL/L (ref 0.5–2)
LIPASE: 57 U/L (ref 23–300)
LYMPHOCYTES #: 0.9 10^3/UL (ref 0.8–2.9)
LYMPHOCYTES %: 9.5 % (ref 15–51)
MEAN CORPUSCULAR HEMOGLOBIN: 26.2 PG (ref 29–33)
MEAN CORPUSCULAR HGB CONC: 32.1 G/DL (ref 32–37)
MEAN CORPUSCULAR VOLUME: 81.5 FL (ref 82–101)
MEAN PLATELET VOLUME: 10 FL (ref 7.4–10.4)
MONOCYTE #: 0.4 10^3/UL (ref 0.3–0.9)
MONOCYTES %: 4.3 % (ref 0–11)
NEUTROPHIL #: 8 10^3/UL (ref 1.6–7.5)
NEUTROPHILS %: 85.5 % (ref 39–77)
NUCLEATED RED BLOOD CELLS #: 0 10^3/UL (ref 0–0)
NUCLEATED RED BLOOD CELLS%: 0 /100WBC (ref 0–0)
PARTIAL THROMBOPLASTIN TIME: 122.2 SEC (ref 25–35)
PARTIAL THROMBOPLASTIN TIME: 131.2 SEC (ref 25–35)
PLATELET COUNT: 301 10^3/UL (ref 140–415)
POTASSIUM: 4 MMOL/L (ref 3.5–5.1)
PROTIME: 50.3 SEC (ref 11.9–14.9)
PROTIME: 53 SEC (ref 11.9–14.9)
PT RATIO: 3.9
PT RATIO: 4.1
RED BLOOD COUNT: 4.43 10^6/UL (ref 4.2–5.4)
RED CELL DISTRIBUTION WIDTH: 15.4 % (ref 11.5–14.5)
SODIUM: 141 MMOL/L (ref 135–144)
TOTAL PROTEIN: 7.4 G/DL (ref 6.1–8.1)
TROPONIN-I: < 0.012 NG/ML (ref 0–0.12)
UR ASCORBIC ACID: NEGATIVE MG/DL
UR BILIRUBIN (DIP): NEGATIVE MG/DL
UR BLOOD (DIP): (no result) MG/DL
UR CLARITY: CLEAR
UR COLOR: (no result)
UR GLUCOSE (DIP): NEGATIVE MG/DL
UR KETONES (DIP): NEGATIVE MG/DL
UR LEUKOCYTE ESTERASE (DIP): NEGATIVE LEU/UL
UR NITRITE (DIP): NEGATIVE MG/DL
UR PH (DIP): 7 (ref 5–9)
UR RBC: 11 /HPF (ref 0–5)
UR SPECIFIC GRAVITY (DIP): 1 (ref 1–1.03)
UR TOTAL PROTEIN (DIP): NEGATIVE MG/DL
UR UROBILINOGEN (DIP): NEGATIVE MG/DL
UR WBC: 1 /HPF (ref 0–5)
WHITE BLOOD COUNT: 9.3 10^3/UL (ref 4.8–10.8)

## 2018-03-21 PROCEDURE — 36415 COLL VENOUS BLD VENIPUNCTURE: CPT

## 2018-03-21 PROCEDURE — 96375 TX/PRO/DX INJ NEW DRUG ADDON: CPT

## 2018-03-21 PROCEDURE — 85730 THROMBOPLASTIN TIME PARTIAL: CPT

## 2018-03-21 PROCEDURE — 71045 X-RAY EXAM CHEST 1 VIEW: CPT

## 2018-03-21 PROCEDURE — 83880 ASSAY OF NATRIURETIC PEPTIDE: CPT

## 2018-03-21 PROCEDURE — 94664 DEMO&/EVAL PT USE INHALER: CPT

## 2018-03-21 PROCEDURE — 87081 CULTURE SCREEN ONLY: CPT

## 2018-03-21 PROCEDURE — 85610 PROTHROMBIN TIME: CPT

## 2018-03-21 PROCEDURE — 99291 CRITICAL CARE FIRST HOUR: CPT

## 2018-03-21 PROCEDURE — 83735 ASSAY OF MAGNESIUM: CPT

## 2018-03-21 PROCEDURE — 96374 THER/PROPH/DIAG INJ IV PUSH: CPT

## 2018-03-21 PROCEDURE — 84484 ASSAY OF TROPONIN QUANT: CPT

## 2018-03-21 PROCEDURE — 83605 ASSAY OF LACTIC ACID: CPT

## 2018-03-21 PROCEDURE — 81001 URINALYSIS AUTO W/SCOPE: CPT

## 2018-03-21 PROCEDURE — 82150 ASSAY OF AMYLASE: CPT

## 2018-03-21 PROCEDURE — 97163 PT EVAL HIGH COMPLEX 45 MIN: CPT

## 2018-03-21 PROCEDURE — 83690 ASSAY OF LIPASE: CPT

## 2018-03-21 PROCEDURE — 93005 ELECTROCARDIOGRAM TRACING: CPT

## 2018-03-21 PROCEDURE — 80069 RENAL FUNCTION PANEL: CPT

## 2018-03-21 PROCEDURE — 87086 URINE CULTURE/COLONY COUNT: CPT

## 2018-03-21 PROCEDURE — 80053 COMPREHEN METABOLIC PANEL: CPT

## 2018-03-21 PROCEDURE — 87040 BLOOD CULTURE FOR BACTERIA: CPT

## 2018-03-21 PROCEDURE — 97110 THERAPEUTIC EXERCISES: CPT

## 2018-03-21 PROCEDURE — 85025 COMPLETE CBC W/AUTO DIFF WBC: CPT

## 2018-03-21 PROCEDURE — 93306 TTE W/DOPPLER COMPLETE: CPT

## 2018-03-21 PROCEDURE — 80048 BASIC METABOLIC PNL TOTAL CA: CPT

## 2018-03-21 RX ADMIN — FUROSEMIDE 1 MG: 10 INJECTION, SOLUTION INTRAVENOUS at 18:00

## 2018-03-21 RX ADMIN — ONDANSETRON HYDROCHLORIDE 1 MG: 2 INJECTION, SOLUTION INTRAMUSCULAR; INTRAVENOUS at 14:18

## 2018-03-21 RX ADMIN — DOCUSATE SODIUM AND SENNOSIDES 1 TAB: 8.6; 5 TABLET, FILM COATED ORAL at 22:16

## 2018-03-21 RX ADMIN — THIAMINE HYDROCHLORIDE 1 MLS/HR: 100 INJECTION, SOLUTION INTRAMUSCULAR; INTRAVENOUS at 16:18

## 2018-03-21 RX ADMIN — METHADONE HYDROCHLORIDE 1 MG: 5 SOLUTION ORAL at 22:16

## 2018-03-21 RX ADMIN — ACETAMINOPHEN 1 MG: 500 TABLET, FILM COATED ORAL at 23:56

## 2018-03-21 RX ADMIN — IPRATROPIUM BROMIDE 1 MG: 0.5 SOLUTION RESPIRATORY (INHALATION) at 13:46

## 2018-03-21 RX ADMIN — ALBUTEROL SULFATE 1 MG: 2.5 SOLUTION RESPIRATORY (INHALATION) at 13:46

## 2018-03-21 RX ADMIN — PHYTONADIONE 1 MLS/HR: 10 INJECTION, EMULSION INTRAMUSCULAR; INTRAVENOUS; SUBCUTANEOUS at 21:48

## 2018-03-21 RX ADMIN — HYDROMORPHONE HYDROCHLORIDE 1 MG: 1 INJECTION, SOLUTION INTRAMUSCULAR; INTRAVENOUS; SUBCUTANEOUS at 14:31

## 2018-03-21 RX ADMIN — FUROSEMIDE 1 MG: 10 INJECTION, SOLUTION INTRAVENOUS at 14:18

## 2018-03-21 RX ADMIN — ONDANSETRON HYDROCHLORIDE 1 MG: 2 INJECTION, SOLUTION INTRAMUSCULAR; INTRAVENOUS at 14:31

## 2018-03-22 LAB
ADD MAN DIFF?: NO
ANION GAP: 15 (ref 8–16)
BASOPHIL #: 0 10^3/UL (ref 0–0.1)
BASOPHILS %: 0.6 % (ref 0–2)
BLOOD UREA NITROGEN: 13 MG/DL (ref 7–20)
CALCIUM: 9.6 MG/DL (ref 8.4–10.2)
CARBON DIOXIDE: 33 MMOL/L (ref 21–31)
CHLORIDE: 101 MMOL/L (ref 97–110)
CREATININE: 1.01 MG/DL (ref 0.44–1)
EOSINOPHILS #: 0.1 10^3/UL (ref 0–0.5)
EOSINOPHILS %: 1.1 % (ref 0–7)
GLUCOSE: 111 MG/DL (ref 70–220)
HEMATOCRIT: 35.9 % (ref 37–47)
HEMOGLOBIN: 11.2 G/DL (ref 12–16)
INR: 1.42
LYMPHOCYTES #: 1.1 10^3/UL (ref 0.8–2.9)
LYMPHOCYTES %: 15 % (ref 15–51)
MAGNESIUM: 2 MG/DL (ref 1.7–2.5)
MEAN CORPUSCULAR HEMOGLOBIN: 25.9 PG (ref 29–33)
MEAN CORPUSCULAR HGB CONC: 31.2 G/DL (ref 32–37)
MEAN CORPUSCULAR VOLUME: 83.1 FL (ref 82–101)
MEAN PLATELET VOLUME: 9.2 FL (ref 7.4–10.4)
MONOCYTE #: 0.5 10^3/UL (ref 0.3–0.9)
MONOCYTES %: 7 % (ref 0–11)
NEUTROPHIL #: 5.4 10^3/UL (ref 1.6–7.5)
NEUTROPHILS %: 76 % (ref 39–77)
NUCLEATED RED BLOOD CELLS #: 0 10^3/UL (ref 0–0)
NUCLEATED RED BLOOD CELLS%: 0 /100WBC (ref 0–0)
PLATELET COUNT: 309 10^3/UL (ref 140–415)
POTASSIUM: 4.2 MMOL/L (ref 3.5–5.1)
PROTIME: 17.6 SEC (ref 11.9–14.9)
PT RATIO: 1.4
RED BLOOD COUNT: 4.32 10^6/UL (ref 4.2–5.4)
RED CELL DISTRIBUTION WIDTH: 15.5 % (ref 11.5–14.5)
SODIUM: 145 MMOL/L (ref 135–144)
WHITE BLOOD COUNT: 7.1 10^3/UL (ref 4.8–10.8)

## 2018-03-22 RX ADMIN — FUROSEMIDE 1 MG: 10 INJECTION, SOLUTION INTRAVENOUS at 06:20

## 2018-03-22 RX ADMIN — METHADONE HYDROCHLORIDE 1 MG: 5 SOLUTION ORAL at 15:41

## 2018-03-22 RX ADMIN — DOCUSATE SODIUM AND SENNOSIDES 1 TAB: 8.6; 5 TABLET, FILM COATED ORAL at 09:46

## 2018-03-22 RX ADMIN — LISINOPRIL 1 MG: 20 TABLET ORAL at 09:00

## 2018-03-22 RX ADMIN — METOPROLOL SUCCINATE 1 MG: 50 TABLET, EXTENDED RELEASE ORAL at 09:00

## 2018-03-22 RX ADMIN — METHADONE HYDROCHLORIDE 1 MG: 5 SOLUTION ORAL at 21:13

## 2018-03-22 RX ADMIN — PAROXETINE HYDROCHLORIDE 1 MG: 20 TABLET, FILM COATED ORAL at 09:46

## 2018-03-22 RX ADMIN — DOCUSATE SODIUM AND SENNOSIDES 1 TAB: 8.6; 5 TABLET, FILM COATED ORAL at 21:13

## 2018-03-22 RX ADMIN — AMLODIPINE BESYLATE 1 MG: 10 TABLET ORAL at 09:00

## 2018-03-22 RX ADMIN — WARFARIN SODIUM 1 MG: 2 TABLET ORAL at 21:14

## 2018-03-22 RX ADMIN — PANTOPRAZOLE SODIUM 1 MG: 40 TABLET, DELAYED RELEASE ORAL at 09:46

## 2018-03-23 LAB
ANION GAP: 15 (ref 8–16)
BLOOD UREA NITROGEN: 11 MG/DL (ref 7–20)
CALCIUM: 9.7 MG/DL (ref 8.4–10.2)
CARBON DIOXIDE: 33 MMOL/L (ref 21–31)
CHLORIDE: 102 MMOL/L (ref 97–110)
CREATININE: 0.89 MG/DL (ref 0.44–1)
GLUCOSE: 102 MG/DL (ref 70–220)
INR: 1.3
POTASSIUM: 4.7 MMOL/L (ref 3.5–5.1)
PROTIME: 16.4 SEC (ref 11.9–14.9)
PT RATIO: 1.3
SODIUM: 145 MMOL/L (ref 135–144)

## 2018-03-23 RX ADMIN — DOCUSATE SODIUM AND SENNOSIDES 1 TAB: 8.6; 5 TABLET, FILM COATED ORAL at 21:08

## 2018-03-23 RX ADMIN — LISINOPRIL 1 MG: 20 TABLET ORAL at 08:28

## 2018-03-23 RX ADMIN — METHADONE HYDROCHLORIDE 1 MG: 5 SOLUTION ORAL at 14:32

## 2018-03-23 RX ADMIN — FUROSEMIDE 1 MG: 10 INJECTION, SOLUTION INTRAVENOUS at 17:49

## 2018-03-23 RX ADMIN — ONDANSETRON HYDROCHLORIDE 1 MG: 2 INJECTION, SOLUTION INTRAMUSCULAR; INTRAVENOUS at 11:33

## 2018-03-23 RX ADMIN — DOCUSATE SODIUM AND SENNOSIDES 1 TAB: 8.6; 5 TABLET, FILM COATED ORAL at 08:27

## 2018-03-23 RX ADMIN — METOPROLOL TARTRATE 1 MG: 25 TABLET ORAL at 21:15

## 2018-03-23 RX ADMIN — ONDANSETRON HYDROCHLORIDE 1 MG: 2 INJECTION, SOLUTION INTRAMUSCULAR; INTRAVENOUS at 22:15

## 2018-03-23 RX ADMIN — FUROSEMIDE 1 MG: 10 INJECTION, SOLUTION INTRAVENOUS at 08:27

## 2018-03-23 RX ADMIN — PANTOPRAZOLE SODIUM 1 MG: 40 TABLET, DELAYED RELEASE ORAL at 08:28

## 2018-03-23 RX ADMIN — WARFARIN SODIUM 1 MG: 2 TABLET ORAL at 17:45

## 2018-03-23 RX ADMIN — PAROXETINE HYDROCHLORIDE 1 MG: 20 TABLET, FILM COATED ORAL at 08:28

## 2018-03-23 RX ADMIN — METHADONE HYDROCHLORIDE 1 MG: 5 SOLUTION ORAL at 05:42

## 2018-03-23 RX ADMIN — ONDANSETRON HYDROCHLORIDE 1 MG: 2 INJECTION, SOLUTION INTRAMUSCULAR; INTRAVENOUS at 14:00

## 2018-03-23 RX ADMIN — METHADONE HYDROCHLORIDE 1 MG: 5 SOLUTION ORAL at 22:15

## 2018-03-23 RX ADMIN — METOPROLOL SUCCINATE 1 MG: 50 TABLET, EXTENDED RELEASE ORAL at 08:28

## 2018-03-23 RX ADMIN — AMLODIPINE BESYLATE 1 MG: 10 TABLET ORAL at 08:27

## 2018-03-23 RX ADMIN — ONDANSETRON HYDROCHLORIDE 1 MG: 2 INJECTION, SOLUTION INTRAMUSCULAR; INTRAVENOUS at 03:33

## 2018-03-24 LAB
INR: 1.98
PROTIME: 23 SEC (ref 11.9–14.9)
PT RATIO: 1.8

## 2018-03-24 RX ADMIN — ONDANSETRON HYDROCHLORIDE 1 MG: 2 INJECTION, SOLUTION INTRAMUSCULAR; INTRAVENOUS at 09:23

## 2018-03-24 RX ADMIN — ACETAMINOPHEN 1 MG: 500 TABLET, FILM COATED ORAL at 21:30

## 2018-03-24 RX ADMIN — METHADONE HYDROCHLORIDE 1 MG: 5 SOLUTION ORAL at 21:34

## 2018-03-24 RX ADMIN — ONDANSETRON HYDROCHLORIDE 1 MG: 2 INJECTION, SOLUTION INTRAMUSCULAR; INTRAVENOUS at 06:36

## 2018-03-24 RX ADMIN — FUROSEMIDE 1 MG: 10 INJECTION, SOLUTION INTRAVENOUS at 13:43

## 2018-03-24 RX ADMIN — FUROSEMIDE 1 MG: 10 INJECTION, SOLUTION INTRAVENOUS at 06:36

## 2018-03-24 RX ADMIN — ACETAMINOPHEN 1 MG: 500 TABLET, FILM COATED ORAL at 10:48

## 2018-03-24 RX ADMIN — DOCUSATE SODIUM AND SENNOSIDES 1 TAB: 8.6; 5 TABLET, FILM COATED ORAL at 21:33

## 2018-03-24 RX ADMIN — ONDANSETRON HYDROCHLORIDE 1 MG: 2 INJECTION, SOLUTION INTRAMUSCULAR; INTRAVENOUS at 21:28

## 2018-03-24 RX ADMIN — DOCUSATE SODIUM AND SENNOSIDES 1 TAB: 8.6; 5 TABLET, FILM COATED ORAL at 10:48

## 2018-03-24 RX ADMIN — METOPROLOL TARTRATE 1 MG: 25 TABLET ORAL at 21:36

## 2018-03-24 RX ADMIN — AMLODIPINE BESYLATE 1 MG: 10 TABLET ORAL at 09:00

## 2018-03-24 RX ADMIN — PAROXETINE HYDROCHLORIDE 1 MG: 20 TABLET, FILM COATED ORAL at 10:48

## 2018-03-24 RX ADMIN — METHADONE HYDROCHLORIDE 1 MG: 5 SOLUTION ORAL at 16:15

## 2018-03-24 RX ADMIN — PANTOPRAZOLE SODIUM 1 MG: 40 TABLET, DELAYED RELEASE ORAL at 10:48

## 2018-03-24 RX ADMIN — METHADONE HYDROCHLORIDE 1 MG: 5 SOLUTION ORAL at 06:38

## 2018-03-24 RX ADMIN — ONDANSETRON HYDROCHLORIDE 1 MG: 2 INJECTION, SOLUTION INTRAMUSCULAR; INTRAVENOUS at 13:40

## 2018-03-24 RX ADMIN — WARFARIN SODIUM 1 MG: 2 TABLET ORAL at 16:15

## 2018-03-24 RX ADMIN — METOPROLOL TARTRATE 1 MG: 25 TABLET ORAL at 09:00

## 2018-03-24 RX ADMIN — LISINOPRIL 1 MG: 20 TABLET ORAL at 09:00

## 2018-03-24 RX ADMIN — FUROSEMIDE 1 MG: 10 INJECTION, SOLUTION INTRAVENOUS at 18:29

## 2018-03-25 LAB
ADD MAN DIFF?: NO
ALANINE AMINOTRANSFERASE: 29 IU/L (ref 13–69)
ALBUMIN/GLOBULIN RATIO: 1.02
ALBUMIN: 3.7 G/DL (ref 3.3–4.9)
ALKALINE PHOSPHATASE: 158 IU/L (ref 42–121)
ANION GAP: 15 (ref 8–16)
ASPARTATE AMINO TRANSFERASE: 30 IU/L (ref 15–46)
BASOPHIL #: 0.1 10^3/UL (ref 0–0.1)
BASOPHILS %: 0.8 % (ref 0–2)
BILIRUBIN,DIRECT: 0 MG/DL (ref 0–0.2)
BILIRUBIN,TOTAL: 0.2 MG/DL (ref 0.2–1.3)
BLOOD UREA NITROGEN: 15 MG/DL (ref 7–20)
CALCIUM: 9.8 MG/DL (ref 8.4–10.2)
CARBON DIOXIDE: 38 MMOL/L (ref 21–31)
CHLORIDE: 95 MMOL/L (ref 97–110)
CREATININE: 1.29 MG/DL (ref 0.44–1)
EOSINOPHILS #: 0.1 10^3/UL (ref 0–0.5)
EOSINOPHILS %: 1.5 % (ref 0–7)
GLOBULIN: 3.6 G/DL (ref 1.3–3.2)
GLUCOSE: 110 MG/DL (ref 70–220)
HEMATOCRIT: 34.3 % (ref 37–47)
HEMOGLOBIN: 10.8 G/DL (ref 12–16)
INR: 2.76
LYMPHOCYTES #: 1.8 10^3/UL (ref 0.8–2.9)
LYMPHOCYTES %: 24.1 % (ref 15–51)
MEAN CORPUSCULAR HEMOGLOBIN: 26.1 PG (ref 29–33)
MEAN CORPUSCULAR HGB CONC: 31.5 G/DL (ref 32–37)
MEAN CORPUSCULAR VOLUME: 82.9 FL (ref 82–101)
MEAN PLATELET VOLUME: 9.2 FL (ref 7.4–10.4)
MONOCYTE #: 0.6 10^3/UL (ref 0.3–0.9)
MONOCYTES %: 8.5 % (ref 0–11)
NEUTROPHIL #: 4.8 10^3/UL (ref 1.6–7.5)
NEUTROPHILS %: 64.7 % (ref 39–77)
NUCLEATED RED BLOOD CELLS #: 0 10^3/UL (ref 0–0)
NUCLEATED RED BLOOD CELLS%: 0 /100WBC (ref 0–0)
PLATELET COUNT: 316 10^3/UL (ref 140–415)
POTASSIUM: 4.3 MMOL/L (ref 3.5–5.1)
PROTIME: 30 SEC (ref 11.9–14.9)
PT RATIO: 2.3
RED BLOOD COUNT: 4.14 10^6/UL (ref 4.2–5.4)
RED CELL DISTRIBUTION WIDTH: 15.4 % (ref 11.5–14.5)
SODIUM: 144 MMOL/L (ref 135–144)
TOTAL PROTEIN: 7.3 G/DL (ref 6.1–8.1)
WHITE BLOOD COUNT: 7.4 10^3/UL (ref 4.8–10.8)

## 2018-03-25 RX ADMIN — FUROSEMIDE 1 MG: 10 INJECTION, SOLUTION INTRAVENOUS at 06:12

## 2018-03-25 RX ADMIN — WARFARIN SODIUM 1 MG: 2 TABLET ORAL at 17:21

## 2018-03-25 RX ADMIN — PAROXETINE HYDROCHLORIDE 1 MG: 20 TABLET, FILM COATED ORAL at 09:02

## 2018-03-25 RX ADMIN — ACETAMINOPHEN 1 MG: 500 TABLET, FILM COATED ORAL at 17:55

## 2018-03-25 RX ADMIN — ONDANSETRON HYDROCHLORIDE 1 MG: 2 INJECTION, SOLUTION INTRAMUSCULAR; INTRAVENOUS at 13:26

## 2018-03-25 RX ADMIN — ONDANSETRON HYDROCHLORIDE 1 MG: 2 INJECTION, SOLUTION INTRAMUSCULAR; INTRAVENOUS at 06:11

## 2018-03-25 RX ADMIN — METOPROLOL TARTRATE 1 MG: 25 TABLET ORAL at 09:02

## 2018-03-25 RX ADMIN — METHADONE HYDROCHLORIDE 1 MG: 5 SOLUTION ORAL at 13:26

## 2018-03-25 RX ADMIN — ONDANSETRON HYDROCHLORIDE 1 MG: 2 INJECTION, SOLUTION INTRAMUSCULAR; INTRAVENOUS at 21:21

## 2018-03-25 RX ADMIN — METHADONE HYDROCHLORIDE 1 MG: 5 SOLUTION ORAL at 06:11

## 2018-03-25 RX ADMIN — ACETAMINOPHEN 1 MG: 500 TABLET, FILM COATED ORAL at 09:59

## 2018-03-25 RX ADMIN — DOCUSATE SODIUM AND SENNOSIDES 1 TAB: 8.6; 5 TABLET, FILM COATED ORAL at 09:02

## 2018-03-25 RX ADMIN — ONDANSETRON HYDROCHLORIDE 1 MG: 2 INJECTION, SOLUTION INTRAMUSCULAR; INTRAVENOUS at 10:01

## 2018-03-25 RX ADMIN — DOCUSATE SODIUM AND SENNOSIDES 1 TAB: 8.6; 5 TABLET, FILM COATED ORAL at 21:21

## 2018-03-25 RX ADMIN — FUROSEMIDE 1 MG: 10 INJECTION, SOLUTION INTRAVENOUS at 17:22

## 2018-03-25 RX ADMIN — METOPROLOL TARTRATE 1 MG: 25 TABLET ORAL at 21:22

## 2018-03-25 RX ADMIN — METHADONE HYDROCHLORIDE 1 MG: 5 SOLUTION ORAL at 21:23

## 2018-03-25 RX ADMIN — PANTOPRAZOLE SODIUM 1 MG: 40 TABLET, DELAYED RELEASE ORAL at 09:02

## 2018-03-26 LAB
ADD MAN DIFF?: NO
ALBUMIN: 3.6 G/DL (ref 3.3–4.9)
ANION GAP: 12 (ref 8–16)
BASOPHIL #: 0.1 10^3/UL (ref 0–0.1)
BASOPHILS %: 0.6 % (ref 0–2)
BLOOD UREA NITROGEN: 17 MG/DL (ref 7–20)
CALCIUM: 9.6 MG/DL (ref 8.4–10.2)
CARBON DIOXIDE: 37 MMOL/L (ref 21–31)
CHLORIDE: 95 MMOL/L (ref 97–110)
CREATININE: 1.23 MG/DL (ref 0.44–1)
EOSINOPHILS #: 0.1 10^3/UL (ref 0–0.5)
EOSINOPHILS %: 1.5 % (ref 0–7)
GLUCOSE: 135 MG/DL (ref 70–220)
HEMATOCRIT: 34.6 % (ref 37–47)
HEMOGLOBIN: 11.1 G/DL (ref 12–16)
INR: 3.46
LYMPHOCYTES #: 1.3 10^3/UL (ref 0.8–2.9)
LYMPHOCYTES %: 16.9 % (ref 15–51)
MAGNESIUM: 2 MG/DL (ref 1.7–2.5)
MEAN CORPUSCULAR HEMOGLOBIN: 26.3 PG (ref 29–33)
MEAN CORPUSCULAR HGB CONC: 32.1 G/DL (ref 32–37)
MEAN CORPUSCULAR VOLUME: 82 FL (ref 82–101)
MEAN PLATELET VOLUME: 9.3 FL (ref 7.4–10.4)
MONOCYTE #: 0.6 10^3/UL (ref 0.3–0.9)
MONOCYTES %: 7.1 % (ref 0–11)
NEUTROPHIL #: 5.8 10^3/UL (ref 1.6–7.5)
NEUTROPHILS %: 73.5 % (ref 39–77)
NUCLEATED RED BLOOD CELLS #: 0 10^3/UL (ref 0–0)
NUCLEATED RED BLOOD CELLS%: 0 /100WBC (ref 0–0)
PHOSPHORUS: 3.8 MG/DL (ref 2.5–4.9)
PLATELET COUNT: 314 10^3/UL (ref 140–415)
POTASSIUM: 3.8 MMOL/L (ref 3.5–5.1)
PROTIME: 35.9 SEC (ref 11.9–14.9)
PT RATIO: 2.8
RED BLOOD COUNT: 4.22 10^6/UL (ref 4.2–5.4)
RED CELL DISTRIBUTION WIDTH: 15.3 % (ref 11.5–14.5)
SODIUM: 140 MMOL/L (ref 135–144)
WHITE BLOOD COUNT: 7.9 10^3/UL (ref 4.8–10.8)

## 2018-03-26 RX ADMIN — PAROXETINE HYDROCHLORIDE 1 MG: 20 TABLET, FILM COATED ORAL at 08:37

## 2018-03-26 RX ADMIN — METOPROLOL TARTRATE 1 MG: 25 TABLET ORAL at 08:37

## 2018-03-26 RX ADMIN — ONDANSETRON HYDROCHLORIDE 1 MG: 2 INJECTION, SOLUTION INTRAMUSCULAR; INTRAVENOUS at 14:36

## 2018-03-26 RX ADMIN — DOCUSATE SODIUM AND SENNOSIDES 1 TAB: 8.6; 5 TABLET, FILM COATED ORAL at 21:15

## 2018-03-26 RX ADMIN — METOPROLOL TARTRATE 1 MG: 25 TABLET ORAL at 21:15

## 2018-03-26 RX ADMIN — ONDANSETRON HYDROCHLORIDE 1 MG: 2 INJECTION, SOLUTION INTRAMUSCULAR; INTRAVENOUS at 06:37

## 2018-03-26 RX ADMIN — FUROSEMIDE 1 MG: 40 TABLET ORAL at 06:37

## 2018-03-26 RX ADMIN — FUROSEMIDE 1 MG: 40 TABLET ORAL at 17:34

## 2018-03-26 RX ADMIN — METHADONE HYDROCHLORIDE 1 MG: 5 SOLUTION ORAL at 14:36

## 2018-03-26 RX ADMIN — METHADONE HYDROCHLORIDE 1 MG: 5 SOLUTION ORAL at 21:16

## 2018-03-26 RX ADMIN — MAGNESIUM HYDROXIDE 1 ML: 400 SUSPENSION ORAL at 06:37

## 2018-03-26 RX ADMIN — METHADONE HYDROCHLORIDE 1 MG: 5 SOLUTION ORAL at 06:36

## 2018-03-26 RX ADMIN — DOCUSATE SODIUM AND SENNOSIDES 1 TAB: 8.6; 5 TABLET, FILM COATED ORAL at 08:36

## 2018-03-26 RX ADMIN — PANTOPRAZOLE SODIUM 1 MG: 40 TABLET, DELAYED RELEASE ORAL at 08:36

## 2018-03-26 RX ADMIN — ONDANSETRON HYDROCHLORIDE 1 MG: 2 INJECTION, SOLUTION INTRAMUSCULAR; INTRAVENOUS at 21:15

## 2018-03-26 RX ADMIN — MAGNESIUM HYDROXIDE 1 ML: 400 SUSPENSION ORAL at 17:34

## 2018-03-27 LAB
ADD MAN DIFF?: NO
ALBUMIN: 3.4 G/DL (ref 3.3–4.9)
ANION GAP: 11 (ref 8–16)
BASOPHIL #: 0.1 10^3/UL (ref 0–0.1)
BASOPHILS %: 0.6 % (ref 0–2)
BLOOD UREA NITROGEN: 18 MG/DL (ref 7–20)
CALCIUM: 9.5 MG/DL (ref 8.4–10.2)
CARBON DIOXIDE: 37 MMOL/L (ref 21–31)
CHLORIDE: 97 MMOL/L (ref 97–110)
CREATININE: 1.02 MG/DL (ref 0.44–1)
EOSINOPHILS #: 0.1 10^3/UL (ref 0–0.5)
EOSINOPHILS %: 0.7 % (ref 0–7)
GLUCOSE: 114 MG/DL (ref 70–220)
HEMATOCRIT: 31.9 % (ref 37–47)
HEMOGLOBIN: 10 G/DL (ref 12–16)
INR: 3.21
LYMPHOCYTES #: 1.5 10^3/UL (ref 0.8–2.9)
LYMPHOCYTES %: 18 % (ref 15–51)
MAGNESIUM: 2.3 MG/DL (ref 1.7–2.5)
MEAN CORPUSCULAR HEMOGLOBIN: 26.2 PG (ref 29–33)
MEAN CORPUSCULAR HGB CONC: 31.3 G/DL (ref 32–37)
MEAN CORPUSCULAR VOLUME: 83.7 FL (ref 82–101)
MEAN PLATELET VOLUME: 9.2 FL (ref 7.4–10.4)
MONOCYTE #: 0.7 10^3/UL (ref 0.3–0.9)
MONOCYTES %: 8.3 % (ref 0–11)
NEUTROPHIL #: 5.9 10^3/UL (ref 1.6–7.5)
NEUTROPHILS %: 71.8 % (ref 39–77)
NUCLEATED RED BLOOD CELLS #: 0 10^3/UL (ref 0–0)
NUCLEATED RED BLOOD CELLS%: 0 /100WBC (ref 0–0)
PHOSPHORUS: 3.4 MG/DL (ref 2.5–4.9)
PLATELET COUNT: 299 10^3/UL (ref 140–415)
POTASSIUM: 4 MMOL/L (ref 3.5–5.1)
PROTIME: 33.8 SEC (ref 11.9–14.9)
PT RATIO: 2.6
RED BLOOD COUNT: 3.81 10^6/UL (ref 4.2–5.4)
RED CELL DISTRIBUTION WIDTH: 15.3 % (ref 11.5–14.5)
SODIUM: 141 MMOL/L (ref 135–144)
WHITE BLOOD COUNT: 8.2 10^3/UL (ref 4.8–10.8)

## 2018-03-27 RX ADMIN — ONDANSETRON HYDROCHLORIDE 1 MG: 2 INJECTION, SOLUTION INTRAMUSCULAR; INTRAVENOUS at 06:45

## 2018-03-27 RX ADMIN — METHADONE HYDROCHLORIDE 1 MG: 5 SOLUTION ORAL at 14:32

## 2018-03-27 RX ADMIN — METOPROLOL TARTRATE 1 MG: 25 TABLET ORAL at 09:13

## 2018-03-27 RX ADMIN — DOCUSATE SODIUM AND SENNOSIDES 1 TAB: 8.6; 5 TABLET, FILM COATED ORAL at 09:13

## 2018-03-27 RX ADMIN — ONDANSETRON HYDROCHLORIDE 1 MG: 2 INJECTION, SOLUTION INTRAMUSCULAR; INTRAVENOUS at 09:11

## 2018-03-27 RX ADMIN — PAROXETINE HYDROCHLORIDE 1 MG: 20 TABLET, FILM COATED ORAL at 09:13

## 2018-03-27 RX ADMIN — METHADONE HYDROCHLORIDE 1 MG: 5 SOLUTION ORAL at 06:45

## 2018-03-27 RX ADMIN — FUROSEMIDE 1 MG: 40 TABLET ORAL at 06:45

## 2018-03-27 RX ADMIN — PANTOPRAZOLE SODIUM 1 MG: 40 TABLET, DELAYED RELEASE ORAL at 09:13

## 2018-03-27 RX ADMIN — ONDANSETRON HYDROCHLORIDE 1 MG: 2 INJECTION, SOLUTION INTRAMUSCULAR; INTRAVENOUS at 14:32
